# Patient Record
Sex: FEMALE | Race: BLACK OR AFRICAN AMERICAN | NOT HISPANIC OR LATINO | Employment: FULL TIME | URBAN - METROPOLITAN AREA
[De-identification: names, ages, dates, MRNs, and addresses within clinical notes are randomized per-mention and may not be internally consistent; named-entity substitution may affect disease eponyms.]

---

## 2017-07-05 DIAGNOSIS — B02.9 HERPES ZOSTER: ICD-10-CM

## 2017-07-05 RX ORDER — GABAPENTIN 300 MG/1
CAPSULE ORAL
Qty: 90 CAPSULE | Refills: 0 | OUTPATIENT
Start: 2017-07-05

## 2017-07-28 ENCOUNTER — TELEPHONE (OUTPATIENT)
Dept: INTERNAL MEDICINE | Facility: CLINIC | Age: 57
End: 2017-07-28

## 2017-07-31 ENCOUNTER — OFFICE VISIT (OUTPATIENT)
Dept: INTERNAL MEDICINE | Facility: CLINIC | Age: 57
End: 2017-07-31
Payer: COMMERCIAL

## 2017-07-31 VITALS
TEMPERATURE: 97 F | BODY MASS INDEX: 40.73 KG/M2 | SYSTOLIC BLOOD PRESSURE: 110 MMHG | DIASTOLIC BLOOD PRESSURE: 64 MMHG | HEIGHT: 64 IN | HEART RATE: 67 BPM | WEIGHT: 238.56 LBS

## 2017-07-31 DIAGNOSIS — R19.7 DIARRHEA, UNSPECIFIED TYPE: ICD-10-CM

## 2017-07-31 DIAGNOSIS — Z87.19 HISTORY OF DIVERTICULITIS: ICD-10-CM

## 2017-07-31 DIAGNOSIS — R10.31 RLQ ABDOMINAL PAIN: Primary | ICD-10-CM

## 2017-07-31 PROCEDURE — 99999 PR PBB SHADOW E&M-EST. PATIENT-LVL III: CPT | Mod: PBBFAC,,, | Performed by: NURSE PRACTITIONER

## 2017-07-31 PROCEDURE — 99214 OFFICE O/P EST MOD 30 MIN: CPT | Mod: S$GLB,,, | Performed by: NURSE PRACTITIONER

## 2017-07-31 RX ORDER — CIPROFLOXACIN 500 MG/1
500 TABLET ORAL 2 TIMES DAILY
Qty: 14 TABLET | Refills: 0 | Status: SHIPPED | OUTPATIENT
Start: 2017-07-31 | End: 2017-08-07

## 2017-07-31 RX ORDER — METRONIDAZOLE 500 MG/1
500 TABLET ORAL 3 TIMES DAILY
Qty: 21 TABLET | Refills: 0 | Status: SHIPPED | OUTPATIENT
Start: 2017-07-31 | End: 2017-08-07

## 2017-07-31 NOTE — PROGRESS NOTES
"Subjective:       Patient ID: Kellie Jorge is a 57 y.o. female.    Chief Complaint: Diarrhea    Pt presents with complaints or RLQ pain, increased BMs/diarrhea x 5 days    She reports 2 episodes of diarrhea daily with loose/soft stool in between. Stools are a greenish color. She reports that soon as she eats something "the food would go right through her". She has been trying to stay hydrated and taking immodium and pepto bismol. She states that she is convinced that its her "diverticulitis acting up". She has been hospitalized for this condition in the past. Recently was on azithromycin -3 weeks ago. No recent foreigh travel. She denies fever, sweats, chills, n/v, skin rashes, joint pain, confusion, headaches, dizziness, or any other acute symptoms.       Review of Systems   Constitutional: Positive for appetite change. Negative for activity change, chills, diaphoresis, fatigue, fever and unexpected weight change.   HENT: Negative.    Eyes: Negative.    Respiratory: Negative.    Cardiovascular: Negative.    Gastrointestinal: Positive for abdominal pain and diarrhea. Negative for abdominal distention, anal bleeding, blood in stool, constipation, nausea, rectal pain and vomiting.   Endocrine: Negative.    Genitourinary: Negative.    Musculoskeletal: Negative.    Skin: Negative.    Allergic/Immunologic: Negative.    Neurological: Negative.    Hematological: Negative.    Psychiatric/Behavioral: Negative.        Objective:      Physical Exam   Constitutional: She is oriented to person, place, and time. She appears well-developed.   Cardiovascular: Normal rate, regular rhythm and normal heart sounds.    Pulmonary/Chest: Effort normal and breath sounds normal.   Abdominal: She exhibits distension (mild ). There is tenderness in the right lower quadrant.   obese   Musculoskeletal: Normal range of motion.   Neurological: She is alert and oriented to person, place, and time.   Skin: Skin is warm and dry.   Psychiatric: She " has a normal mood and affect.       Assessment:       1. RLQ abdominal pain    2. History of diverticulitis    3. Diarrhea, unspecified type        Plan:   RLQ abdominal pain  -     ciprofloxacin HCl (CIPRO) 500 MG tablet; Take 1 tablet (500 mg total) by mouth 2 (two) times daily.  Dispense: 14 tablet; Refill: 0  -     metronidazole (FLAGYL) 500 MG tablet; Take 1 tablet (500 mg total) by mouth 3 (three) times daily.  Dispense: 21 tablet; Refill: 0    History of diverticulitis  -     ciprofloxacin HCl (CIPRO) 500 MG tablet; Take 1 tablet (500 mg total) by mouth 2 (two) times daily.  Dispense: 14 tablet; Refill: 0  -     metronidazole (FLAGYL) 500 MG tablet; Take 1 tablet (500 mg total) by mouth 3 (three) times daily.  Dispense: 21 tablet; Refill: 0    Diarrhea, unspecified type  -     ciprofloxacin HCl (CIPRO) 500 MG tablet; Take 1 tablet (500 mg total) by mouth 2 (two) times daily.  Dispense: 14 tablet; Refill: 0  -     metronidazole (FLAGYL) 500 MG tablet; Take 1 tablet (500 mg total) by mouth 3 (three) times daily.  Dispense: 21 tablet; Refill: 0      following guidelines will help you care for yourself at home:  · During the acute illness, rest and follow your healthcare provider's instructions about diet. Sometimes you will need to follow a clear liquid diet to rest your bowel. Once your symptoms are better, you may be told to follow a low-fiber diet for some time. Include foods like:  ? Flake cereal, mashed potatoes, pancakes, waffles, pasta, white bread, rice, applesauce, bananas, eggs, fish, poultry, tofu, and cooked soft vegetables  · Take antibiotics exactly as instructed. Don't miss any doses or stop taking the medication, even if you feel better.  · Monitor your temperature and tell your healthcare provider if you have rising temperatures.  Preventing future attacks  Once you have an episode of diverticulitis, you are at risk for having it again. After you have recovered from this episode, you may be able  to lower your risk by eating a high-fiber diet (20 gm/day to 35 gm/day of fiber). This cleans out the colon pouches that already exist and may prevent new ones from forming. Foods high in fiber include fresh fruits and edible peelings, raw or lightly cooked vegetables, whole grain cereals and breads, dried beans and peas, and bran.  Other steps that can help prevent future attacks include:  · Take your medicines, such as antibiotics, as your healthcare provider says.  · Drink 6 to 8 glasses of water every day, unless told otherwise.  · Use a heating pad or hot water bottle to help abdominal cramping or pain.  · Begin an exercise program. Ask your healthcare provider how to get started. You can benefit from simple activities such as walking or gardening.  · Treat diarrhea with a bland diet. Start with liquids only; then slowly add fiber over time.  · Watch for changes in your bowel movements (constipation to diarrhea). Avoid constipation by eating a high fiber diet and taking a stool softener if needed.  · Get plenty of rest and sleep.  Follow-up care  Follow up with your healthcare provider as advised or sooner if you are not getting better in the next 2 days.  When to seek medical advice  Call your healthcare provider right away if any of these occur:  · Fever of 100.4°F (38°C) or higher, or as directed by your healthcare provider  · Repeated vomiting or swelling of the abdomen  · Weakness, dizziness, light-headedness  · Pain in your abdomen that gets worse, severe, or spreads to your back  · Pain that moves to the right lower abdomen  · Rectal bleeding (stools that are red, black or maroon color)  · Unexpected vaginal bleeding

## 2018-01-15 ENCOUNTER — OFFICE VISIT (OUTPATIENT)
Dept: INTERNAL MEDICINE | Facility: CLINIC | Age: 58
End: 2018-01-15
Payer: COMMERCIAL

## 2018-01-15 VITALS
SYSTOLIC BLOOD PRESSURE: 122 MMHG | BODY MASS INDEX: 40.46 KG/M2 | HEART RATE: 62 BPM | TEMPERATURE: 96 F | WEIGHT: 237 LBS | OXYGEN SATURATION: 98 % | HEIGHT: 64 IN | DIASTOLIC BLOOD PRESSURE: 78 MMHG

## 2018-01-15 DIAGNOSIS — E66.01 MORBID OBESITY WITH BMI OF 40.0-44.9, ADULT: ICD-10-CM

## 2018-01-15 DIAGNOSIS — I10 ESSENTIAL HYPERTENSION: ICD-10-CM

## 2018-01-15 DIAGNOSIS — H92.02 ACUTE OTALGIA, LEFT: ICD-10-CM

## 2018-01-15 DIAGNOSIS — J02.0 PHARYNGITIS DUE TO STREPTOCOCCUS SPECIES: Primary | ICD-10-CM

## 2018-01-15 DIAGNOSIS — J01.00 ACUTE NON-RECURRENT MAXILLARY SINUSITIS: ICD-10-CM

## 2018-01-15 LAB — DEPRECATED S PYO AG THROAT QL EIA: NEGATIVE

## 2018-01-15 PROCEDURE — 99214 OFFICE O/P EST MOD 30 MIN: CPT | Mod: S$GLB,,, | Performed by: FAMILY MEDICINE

## 2018-01-15 PROCEDURE — 99999 PR PBB SHADOW E&M-EST. PATIENT-LVL IV: CPT | Mod: PBBFAC,,, | Performed by: FAMILY MEDICINE

## 2018-01-15 PROCEDURE — 87880 STREP A ASSAY W/OPTIC: CPT | Mod: PO

## 2018-01-15 PROCEDURE — 87081 CULTURE SCREEN ONLY: CPT

## 2018-01-15 RX ORDER — FLUTICASONE PROPIONATE 50 MCG
1 SPRAY, SUSPENSION (ML) NASAL DAILY
Qty: 16 G | Refills: 0 | Status: SHIPPED | OUTPATIENT
Start: 2018-01-15

## 2018-01-15 NOTE — MEDICAL/APP STUDENT
"Progress Note    Patient Name: Kellie Jorge  MRN: [id number]    Patient information was obtained from patient.    Subjective       Chief Complaint:   Chief Complaint   Patient presents with    Sore Throat    Otalgia     left ear    Fatigue       HPI   Kellie Jorge is a 57 y.o. female who  has a past medical history of CAD (coronary artery disease); Hyperlipidemia; Hypertension; and Thyroid nodule., presenting with hx of sore throat that has now also caused sinus congestion and a headache.     The history was provided by the patient.    The patient says that the Saturday before Christmas she went to the ED with a severely sore throat. She tested positive for strep and was given clindamycin. In mid January, she still did not feel better so went to a doctor January 12th who gave her rocephin. She states for a few days she felt better and then beginning yesterday began feeling a sore throat again. She says when she swallows it burns all the way down her throat. Today the sore throat is better than yesterday but now has sinus congestion and pain as well as a headache. She also complains of left ear pain. She came today worried that she has strep and that it was never properly treated and is now moving to her ear and sinuses.         Review Of Systems:  Review of Systems   Constitutional: Negative for chills and fever.   HENT: Positive for congestion, ear pain, sinus pain and sore throat.    Respiratory: Negative for cough, sputum production and shortness of breath.    Cardiovascular: Negative for chest pain and palpitations.       Objective     Vitals:    01/15/18 1127   BP: 122/78   Pulse: 62   Temp: 96.3 °F (35.7 °C)   TempSrc: Tympanic   SpO2: 98%   Weight: 107.5 kg (236 lb 15.9 oz)   Height: 5' 4" (1.626 m)       Physical Exam:  Physical Exam   Constitutional: She appears well-developed and well-nourished.   HENT:   Right Ear: Hearing, tympanic membrane and external ear normal.   Left Ear: Hearing, tympanic " membrane and external ear normal. There is tenderness.   Nose: Nose normal.   Mouth/Throat: Uvula is midline. Posterior oropharyngeal erythema present. Tonsils are 1+ on the right. Tonsils are 1+ on the left. No tonsillar exudate.   Eyes: Pupils are equal, round, and reactive to light.   Neck: Normal range of motion.   Cardiovascular: Normal rate and regular rhythm.          ASSESSMENT/PLAN:     Plan    Patient Active Problem List   Diagnosis    Essential hypertension    Gastroesophageal reflux disease       1. Sore throat and sinus congestion  - repeat strep test  - possible stronger abx prescription depending on strep test result  - if strep test is negative, continue flonase for sinus pressure as well as salt water gargles until post infectious sx have cleared.    2. HTN  - continue medications    3. GERD  - continue medications      ______________________________________________________________________  Lyssa Vesselinovitch  Medical Student

## 2018-01-15 NOTE — PROGRESS NOTES
"Subjective:       Patient ID: Kellie Jorge is a 57 y.o. female.    Chief Complaint: Sore Throat; Otalgia (left ear); and Fatigue    57-year-old Afro-American female patient with Patient Active Problem List:     Essential hypertension     Gastroesophageal reflux disease   Here with complaint of being diagnosed with strep throat prior to Christmas, and has been treated with clindamycin, has been checked for flu which was negative.   Patient was also given 2 shots of Rocephin, and has symptoms have been ongoing was given Depo-Medrol last week.   Patient continues to have sore throat, reports that she has minimal pressure in the sinuses, denies of any fever, had to start with.   Patient occasionally feels pain shooting down to her throat, and she was also given Zantac for 10 days.  Patient does have history of sinus infections  Reports that she's been having left ear ache that started few days ago.         Review of Systems   Constitutional: Negative for chills, fatigue and fever.   HENT: Positive for congestion, ear pain, postnasal drip, sinus pain and sore throat. Negative for trouble swallowing and voice change.    Eyes: Negative for visual disturbance.   Respiratory: Negative for cough, shortness of breath and wheezing.    Cardiovascular: Negative for chest pain and leg swelling.   Gastrointestinal: Negative for abdominal pain, nausea and vomiting.   Musculoskeletal: Negative for myalgias.   Skin: Negative for rash.   Neurological: Positive for headaches. Negative for light-headedness.   Psychiatric/Behavioral: Negative for sleep disturbance.         /78   Pulse 62   Temp 96.3 °F (35.7 °C) (Tympanic)   Ht 5' 4" (1.626 m)   Wt 107.5 kg (236 lb 15.9 oz)   SpO2 98%   BMI 40.68 kg/m²   Objective:      Physical Exam   Constitutional: She is oriented to person, place, and time. She appears well-developed and well-nourished.   HENT:   Head: Normocephalic and atraumatic.   Right Ear: External ear normal. "   Left Ear: External ear normal.   Mouth/Throat: Oropharynx is clear and moist. No oropharyngeal exudate.   Positive for sinus pressure in bilateral maxillary areas on palpation   Neck: Neck supple.   Cardiovascular: Normal rate, regular rhythm and normal heart sounds.    No murmur heard.  Pulmonary/Chest: Effort normal and breath sounds normal. She has no wheezes.   Abdominal: Soft. Bowel sounds are normal. There is no tenderness.   Musculoskeletal: She exhibits no edema.   Lymphadenopathy:     She has no cervical adenopathy.   Neurological: She is alert and oriented to person, place, and time.   Skin: Skin is warm and dry. No rash noted.   Psychiatric: She has a normal mood and affect.         Assessment:       1. Pharyngitis due to Streptococcus species    2. Acute otalgia, left    3. Acute non-recurrent maxillary sinusitis    4. Essential hypertension    5. Morbid obesity with BMI of 40.0-44.9, adult        Plan:   Pharyngitis due to Streptococcus species  -     Throat Screen, Rapid  Acute otalgia, left  Acute non-recurrent maxillary sinusitis  -     fluticasone (FLONASE) 50 mcg/actuation nasal spray; 1 spray (50 mcg total) by Each Nare route once daily.  Dispense: 16 g; Refill: 0  Patient reports that she was recently treated treated for strep with clindamycin, Rocephin injection ×2 and Depo-Medrol shot recently  Flonase prescribed today for sinus infection  Could be lingering infection secondary to bad weather  Will recheck for strep for clearance  Salt water gargles and Chloraseptic sprays recommended  Advised to take over-the-counter Zyrtec and drink adequate fluids  Can take ibuprofen as needed for pain    Essential hypertension-blood pressure stable today currently on Edarbi and clonidine 0.2 mg daily     Morbid obesity with BMI of 40.0-44.9, adult-lifestyle modifications recommended to lose weight with BMI 40

## 2018-01-17 LAB — BACTERIA THROAT CULT: NORMAL

## 2018-01-24 ENCOUNTER — TELEPHONE (OUTPATIENT)
Dept: INTERNAL MEDICINE | Facility: CLINIC | Age: 58
End: 2018-01-24

## 2018-01-24 DIAGNOSIS — J02.9 PHARYNGITIS, UNSPECIFIED ETIOLOGY: Primary | ICD-10-CM

## 2018-01-24 NOTE — TELEPHONE ENCOUNTER
----- Message from Lucero Leonard LPN sent at 1/24/2018  2:34 PM CST -----  Contact: Patient      ----- Message -----  From: Sera Caro  Sent: 1/24/2018   2:26 PM  To: [facility] Staff    Patient called and stated she is having a sore throat and she wants to know if she can be referral to ENT. She can be contacted at [phone].    Thanks,  Sera

## 2018-01-25 ENCOUNTER — OFFICE VISIT (OUTPATIENT)
Dept: OTOLARYNGOLOGY | Facility: CLINIC | Age: 58
End: 2018-01-25
Payer: COMMERCIAL

## 2018-01-25 VITALS — BODY MASS INDEX: 40.57 KG/M2 | TEMPERATURE: 98 F | WEIGHT: 236.31 LBS

## 2018-01-25 DIAGNOSIS — Z86.39 HISTORY OF THYROID NODULE: ICD-10-CM

## 2018-01-25 DIAGNOSIS — H92.02 LEFT EAR PAIN: ICD-10-CM

## 2018-01-25 DIAGNOSIS — J02.9 SORE THROAT: Primary | ICD-10-CM

## 2018-01-25 PROCEDURE — 99999 PR PBB SHADOW E&M-EST. PATIENT-LVL III: CPT | Mod: PBBFAC,,, | Performed by: PHYSICIAN ASSISTANT

## 2018-01-25 PROCEDURE — 99244 OFF/OP CNSLTJ NEW/EST MOD 40: CPT | Mod: S$GLB,,, | Performed by: PHYSICIAN ASSISTANT

## 2018-01-25 NOTE — LETTER
January 25, 2018      Clara Villanueva MD  [address] [place] [address]           [facility]  [address] [facility] [place] [address]  Phone: [phone]  Fax: [phone]          Patient: Kellie Jorge   MR Number: [id number]   YOB: 1960   Date of Visit: 1/25/2018       Dear Dr. Clara Villanueva:    Thank you for referring Kellie Jorge to me for evaluation. Attached you will find relevant portions of my assessment and plan of care.    If you have questions, please do not hesitate to call me. I look forward to following Kellie Jorge along with you.    Sincerely,    Agnes Adorno PA-C    Enclosure  CC:  No Recipients    If you would like to receive this communication electronically, please contact [email or web address] or [phone] to request more information on "[employer] Link access.    For providers and/or their staff who would like to refer a patient to [facility], please contact us through our one-stop-shop provider referral line, [facility] , at [phone].    If you feel you have received this communication in error or would no longer like to receive these types of communications, please e-mail [email or web address]

## 2018-01-29 ENCOUNTER — HOSPITAL ENCOUNTER (OUTPATIENT)
Dept: RADIOLOGY | Facility: HOSPITAL | Age: 58
Discharge: HOME | End: 2018-01-29
Attending: PHYSICIAN ASSISTANT
Payer: COMMERCIAL

## 2018-01-29 DIAGNOSIS — Z86.39 HISTORY OF THYROID NODULE: ICD-10-CM

## 2018-01-29 PROCEDURE — 76536 US EXAM OF HEAD AND NECK: CPT | Mod: 26,,, | Performed by: RADIOLOGY

## 2018-01-29 PROCEDURE — 76536 US EXAM OF HEAD AND NECK: CPT | Mod: TC,PO

## 2018-01-30 ENCOUNTER — OFFICE VISIT (OUTPATIENT)
Dept: OTOLARYNGOLOGY | Facility: CLINIC | Age: 58
End: 2018-01-30
Payer: COMMERCIAL

## 2018-01-30 VITALS
TEMPERATURE: 98 F | SYSTOLIC BLOOD PRESSURE: 144 MMHG | BODY MASS INDEX: 40.04 KG/M2 | WEIGHT: 233.25 LBS | HEART RATE: 64 BPM | DIASTOLIC BLOOD PRESSURE: 83 MMHG

## 2018-01-30 DIAGNOSIS — J31.2 CHRONIC SORE THROAT: Primary | ICD-10-CM

## 2018-01-30 DIAGNOSIS — E04.1 THYROID NODULE: ICD-10-CM

## 2018-01-30 DIAGNOSIS — K21.9 GASTROESOPHAGEAL REFLUX DISEASE, ESOPHAGITIS PRESENCE NOT SPECIFIED: ICD-10-CM

## 2018-01-30 PROCEDURE — 99214 OFFICE O/P EST MOD 30 MIN: CPT | Mod: 25,S$GLB,, | Performed by: ORTHOPAEDIC SURGERY

## 2018-01-30 PROCEDURE — 99999 PR PBB SHADOW E&M-EST. PATIENT-LVL III: CPT | Mod: PBBFAC,,, | Performed by: ORTHOPAEDIC SURGERY

## 2018-01-30 PROCEDURE — 31575 DIAGNOSTIC LARYNGOSCOPY: CPT | Mod: S$GLB,,, | Performed by: ORTHOPAEDIC SURGERY

## 2018-01-30 NOTE — PROGRESS NOTES
Subjective:      Patient ID: Kellie Jorge is a 57 y.o. female.    Chief Complaint: Results (Review thyroid ultrasound)    Patient is a very pleasant 57 year old female here to see me today in followup for evaluation of intermittent throat pain.  She says that it has been an issue since she had strep throat in December.  I saw her in 2016 with similar complaints, and she had seen an ENT just prior to that visit who had done a FFL which was normal.  She has not noted any reflux or heartburn, but does have with certain foods.  She is taking OTC omeprazole twice daily as needed for reflux, and has been using consistently due to recent throat pain.  She has recently had a thyroid US, she says that she had an US several years ago with a very small abnormality.  She has no dysphagia to solids or liquids, but does have intermittent odynophagia.  She has not seen GI.  She has no family history of thyroid issues.          Review of Systems   Constitutional: Negative for chills, fatigue, fever and unexpected weight change.   HENT: Positive for congestion, postnasal drip, sore throat and trouble swallowing. Negative for dental problem, ear discharge, ear pain, facial swelling, hearing loss, nosebleeds, rhinorrhea, sinus pressure, sneezing, tinnitus and voice change.    Eyes: Negative for redness, itching and visual disturbance.   Respiratory: Negative for cough, choking, shortness of breath and wheezing.    Cardiovascular: Negative for chest pain and palpitations.   Gastrointestinal: Negative for abdominal pain.        No reflux.   Musculoskeletal: Negative for gait problem.   Skin: Negative for rash.   Neurological: Positive for headaches. Negative for dizziness and light-headedness.       Objective:       Physical Exam   Constitutional: She is oriented to person, place, and time. She appears well-developed and well-nourished. No distress.   HENT:   Head: Normocephalic and atraumatic.   Right Ear: Tympanic membrane, external  ear and ear canal normal.   Left Ear: Tympanic membrane, external ear and ear canal normal.   Nose: Nose normal. No mucosal edema, rhinorrhea, nasal deformity or septal deviation. No epistaxis. Right sinus exhibits no maxillary sinus tenderness and no frontal sinus tenderness. Left sinus exhibits no maxillary sinus tenderness and no frontal sinus tenderness.   Mouth/Throat: Uvula is midline, oropharynx is clear and moist and mucous membranes are normal. Mucous membranes are not pale and not dry. No dental caries. No oropharyngeal exudate or posterior oropharyngeal erythema.   Eyes: Conjunctivae, EOM and lids are normal. Pupils are equal, round, and reactive to light. Right eye exhibits no chemosis. Left eye exhibits no chemosis. Right conjunctiva is not injected. Left conjunctiva is not injected. No scleral icterus. Right eye exhibits normal extraocular motion and no nystagmus. Left eye exhibits normal extraocular motion and no nystagmus.   Neck: Trachea normal and phonation normal. No tracheal tenderness present. No tracheal deviation present. No thyroid mass and no thyromegaly present.   Cardiovascular: Intact distal pulses.    Pulmonary/Chest: Effort normal. No stridor. No respiratory distress.   Abdominal: She exhibits no distension.   Lymphadenopathy:        Head (right side): No submental, no submandibular, no preauricular, no posterior auricular and no occipital adenopathy present.        Head (left side): No submental, no submandibular, no preauricular, no posterior auricular and no occipital adenopathy present.     She has no cervical adenopathy.   Neurological: She is alert and oriented to person, place, and time. No cranial nerve deficit.   Skin: Skin is warm and dry. No rash noted. No erythema.   Psychiatric: She has a normal mood and affect. Her behavior is normal.     US THYROID:    Findings: The thyroid gland demonstrates a smooth homogeneous appearance. The right lobe measures 5.1 x 1.7 x 1.6 cm.  Left lobe measures 4.8 x 1.6 x 1.2 cm. This isthmus measures 2.7 mm in thickness. Within the inferior right lobe there is a 3 mm complex cystic nodule. No other thyroid nodule is identified.    Overall gland volume measures 11.3 mm.   Impression      Homogeneous appearance of the thyroid gland. Please see above for gland measurements. No suspicious nodules identified at this time.       Due to indication in patient's history, presentation or risk factors,  a fiber optic exam was performed.    SEPARATE PROCEDURE NOTE:    ANESTHESIA:  Topical xylocaine with roxanna-synephrine  FINDINGS:  Normal exam      PROCEDURE:  After verbal consent was obtained, the flexible scope was passed through the patient's nasal cavity without difficulty.  The nasopharynx (adenoid pad) and eustachian tube orifices were first visualized and were found to be normal, without masses or irregularity.  The posterior pharyngeal wall and base of tongue were then examined and no mass or irregular tissue was seen.  The scope was then advanced to the larynx, and the epiglottis, valleculae, and piriform sinuses were normal, without masses or mucosal irregularity.  The false vocal folds and true vocal folds were then examined and were found to have normal mobility (full abduction and adduction) and no masses or mucosal irregularity was seen.  The arytenoids and the interarytenoid area were normal.          Assessment:       1. Chronic sore throat    2. Gastroesophageal reflux disease, esophagitis presence not specified    3. Thyroid nodule        Plan:     Chronic sore throat:  Has been an intermittent issue for over one year.  Reassured patient that her exam today is normal, and does not show any cause for her throat pain.  At this point, I would recommend evaluation with GI, referral placed and scheduled.    Gastroesophageal reflux disease, esophagitis presence not specified:  Currently on prilosec bid.  Will have GI evaluation as above.    Thyroid  nodule:  Very small, would recommend repeat US in 1-2 years with PCP, return to clinic for any change or growth in the nodule.

## 2018-02-06 ENCOUNTER — INITIAL CONSULT (OUTPATIENT)
Dept: GASTROENTEROLOGY | Facility: CLINIC | Age: 58
End: 2018-02-06
Payer: COMMERCIAL

## 2018-02-06 VITALS
HEIGHT: 65 IN | HEART RATE: 66 BPM | SYSTOLIC BLOOD PRESSURE: 152 MMHG | WEIGHT: 237.63 LBS | BODY MASS INDEX: 39.59 KG/M2 | DIASTOLIC BLOOD PRESSURE: 78 MMHG

## 2018-02-06 DIAGNOSIS — J02.9 SORE THROAT: ICD-10-CM

## 2018-02-06 DIAGNOSIS — K21.9 GASTROESOPHAGEAL REFLUX DISEASE, ESOPHAGITIS PRESENCE NOT SPECIFIED: Primary | ICD-10-CM

## 2018-02-06 PROCEDURE — 99999 PR PBB SHADOW E&M-EST. PATIENT-LVL III: CPT | Mod: PBBFAC,,, | Performed by: NURSE PRACTITIONER

## 2018-02-06 PROCEDURE — 3008F BODY MASS INDEX DOCD: CPT | Mod: ,,, | Performed by: NURSE PRACTITIONER

## 2018-02-06 PROCEDURE — 99204 OFFICE O/P NEW MOD 45 MIN: CPT | Mod: S$GLB,,, | Performed by: NURSE PRACTITIONER

## 2018-02-06 RX ORDER — OMEPRAZOLE 40 MG/1
40 CAPSULE, DELAYED RELEASE ORAL DAILY
Qty: 30 CAPSULE | Refills: 5 | Status: SHIPPED | OUTPATIENT
Start: 2018-02-06 | End: 2018-07-19

## 2018-02-06 NOTE — LETTER
February 6, 2018      Samantha Shanks MD  [address] [place] [address] [address] [place] [address]           [facility]  [address] [facility] [address] [place] [address]  Phone: [phone]  Fax: [phone]          Patient: Kellie Jorge   MR Number: [id number]   YOB: 1960   Date of Visit: 2/6/2018       Dear Dr. Samantha Shanks:    Thank you for referring Kellie Jorge to me for evaluation. Attached you will find relevant portions of my assessment and plan of care.    If you have questions, please do not hesitate to call me. I look forward to following Kellie Jorge along with you.    Sincerely,    Magaly Reid, NP    Enclosure  CC:  No Recipients    If you would like to receive this communication electronically, please contact [email or web address] or [phone] to request more information on [employer] Link access.    For providers and/or their staff who would like to refer a patient to [facility], please contact us through our one-stop-shop provider referral line, [facility], at [phone].    If you feel you have received this communication in error or would no longer like to receive these types of communications, please e-mail [email or web address]

## 2018-02-06 NOTE — PROGRESS NOTES
Clinic Consult:  [facility] Consultation Note    Reason for Consult:  The primary encounter diagnosis was Gastroesophageal reflux disease, esophagitis presence not specified. A diagnosis of Sore throat was also pertinent to this visit.    PCP: Clara Villanueva   [address] [place] [address]  / [place] [id number]    HPI:  This is a 57 y.o. female here for evaluation of the above issues. She was referred to me by Dr. Shanks. She presents to clinic with complaints of chronic sore throat. Onset of symptoms began in April 2016 and have been intermittent since then. She has a history of GERD and has been on omeprazole 20mg twice a day without relief. She also reports some discomfort when eating but denies any dysphagia. ENT evaluation unrevealing. Recommended GI referral for evaluation of reflux as potential etiology of sore throat. No nausea or vomiting. She has been taking NSAIDs for throat pain.     Review of Systems   Constitutional: Negative for fever, malaise/fatigue and weight loss.   HENT: Positive for sore throat.    Respiratory: Negative for cough and wheezing.    Cardiovascular: Negative for chest pain and palpitations.   Gastrointestinal: Negative for abdominal pain, blood in stool, constipation, diarrhea, melena, nausea and vomiting.   Genitourinary: Negative for dysuria and frequency.   Musculoskeletal: Negative for back pain, joint pain, myalgias and neck pain.   Skin: Negative for itching and rash.   Neurological: Negative for dizziness, speech change, seizures, loss of consciousness and headaches.   Psychiatric/Behavioral: Negative for depression and substance abuse. The patient is not nervous/anxious.        Medical History:  has a past medical history of CAD (coronary artery disease); Hyperlipidemia; Hypertension; and Thyroid nodule.    Surgical History:  has a past surgical history that includes Cholecystectomy; Appendectomy; and Coronary artery bypass graft.    Family History: family history  "includes Heart disease in her father; Hypertension in her father; No Known Problems in her mother..     Social History:  reports that she has never smoked. She has never used smokeless tobacco. She reports that she does not drink alcohol.    Allergies: Reviewed    Home Medications:   Current Outpatient Prescriptions on File Prior to Visit   Medication Sig Dispense Refill    aspirin 325 MG tablet Take 325 mg by mouth once daily.      azilsartan medoxomil (EDARBI) 80 mg Tab Take 1 tablet by mouth once daily.      cloNIDine (CATAPRES) 0.2 MG tablet Take 0.2 mg by mouth every evening.      fluticasone (FLONASE) 50 mcg/actuation nasal spray 1 spray (50 mcg total) by Each Nare route once daily. 16 g 0    furosemide (LASIX) 40 MG tablet Take 40 mg by mouth once daily.      POTASSIUM CHLORIDE (KLOR-CON ORAL) Take 1 tablet by mouth once daily.       No current facility-administered medications on file prior to visit.        Physical Exam:  Vital Signs:  BP (!) 152/78   Pulse 66   Ht 5' 4.8" (1.646 m)   Wt 107.8 kg (237 lb 10.5 oz)   BMI 39.79 kg/m²   Body mass index is 39.79 kg/m².  Physical Exam   Constitutional: She is oriented to person, place, and time and well-developed, well-nourished, and in no distress. No distress.   HENT:   Head: Normocephalic.   Eyes: Conjunctivae are normal. Pupils are equal, round, and reactive to light.   Cardiovascular: Normal rate, regular rhythm and normal heart sounds.    Pulmonary/Chest: Effort normal and breath sounds normal. No respiratory distress.   Abdominal: Soft. Bowel sounds are normal. She exhibits no distension. There is no tenderness.   Neurological: She is alert and oriented to person, place, and time. No cranial nerve deficit.   Skin: Skin is warm and dry. No rash noted.   Psychiatric: Mood and affect normal.       Labs: Pertinent labs reviewed.    Endoscopy:  None prior    CRC Screening: per patient, up to date. Done at external facility    Assessment:  1. " Gastroesophageal reflux disease, esophagitis presence not specified    2. Sore throat           Recommendations:  Gastroesophageal reflux disease, esophagitis presence not specified  - continue omeprazole but refilled medication with increased dose.   - add zantac  - unclear if etiology of store throat  - EGD needed for further evaluation  -     omeprazole (PRILOSEC) 40 MG capsule; Take 1 capsule (40 mg total) by mouth once daily.  Dispense: 30 capsule; Refill: 5  -     ranitidine (ZANTAC) 150 MG tablet; Take 1 tablet (150 mg total) by mouth 2 (two) times daily.  Dispense: 60 tablet; Refill: 5  -     Case request GI: ESOPHAGOGASTRODUODENOSCOPY (EGD)    Sore throat  - unclear etiology  - plan for EGD  - follow up in 6 weeks.   -     Case request GI: ESOPHAGOGASTRODUODENOSCOPY (EGD)    Follow-up in about 6 weeks (around 3/20/2018).    Thank you so much for allowing me to participate in the care of LINA Miramontes

## 2018-02-12 ENCOUNTER — ANESTHESIA EVENT (OUTPATIENT)
Dept: ENDOSCOPY | Facility: HOSPITAL | Age: 58
End: 2018-02-12
Payer: COMMERCIAL

## 2018-02-12 ENCOUNTER — ANESTHESIA (OUTPATIENT)
Dept: ENDOSCOPY | Facility: HOSPITAL | Age: 58
End: 2018-02-12
Payer: COMMERCIAL

## 2018-02-12 ENCOUNTER — SURGERY (OUTPATIENT)
Age: 58
End: 2018-02-12

## 2018-02-12 ENCOUNTER — HOSPITAL ENCOUNTER (OUTPATIENT)
Facility: HOSPITAL | Age: 58
Discharge: HOME | End: 2018-02-12
Attending: INTERNAL MEDICINE | Admitting: INTERNAL MEDICINE
Payer: COMMERCIAL

## 2018-02-12 VITALS
BODY MASS INDEX: 39.95 KG/M2 | HEART RATE: 55 BPM | RESPIRATION RATE: 16 BRPM | WEIGHT: 234 LBS | SYSTOLIC BLOOD PRESSURE: 123 MMHG | DIASTOLIC BLOOD PRESSURE: 69 MMHG | TEMPERATURE: 98 F | OXYGEN SATURATION: 97 % | HEIGHT: 64 IN

## 2018-02-12 DIAGNOSIS — K21.9 GERD (GASTROESOPHAGEAL REFLUX DISEASE): ICD-10-CM

## 2018-02-12 DIAGNOSIS — K22.10 ULCER OF ESOPHAGUS WITHOUT BLEEDING: ICD-10-CM

## 2018-02-12 DIAGNOSIS — K25.7 CHRONIC GASTRIC ULCER WITHOUT HEMORRHAGE AND WITHOUT PERFORATION: Primary | ICD-10-CM

## 2018-02-12 DIAGNOSIS — K29.30 SUPERFICIAL GASTRITIS WITHOUT HEMORRHAGE, UNSPECIFIED CHRONICITY: ICD-10-CM

## 2018-02-12 PROCEDURE — 25000003 PHARM REV CODE 250: Performed by: NURSE ANESTHETIST, CERTIFIED REGISTERED

## 2018-02-12 PROCEDURE — 43239 EGD BIOPSY SINGLE/MULTIPLE: CPT | Performed by: INTERNAL MEDICINE

## 2018-02-12 PROCEDURE — 37000008 HC ANESTHESIA 1ST 15 MINUTES: Performed by: INTERNAL MEDICINE

## 2018-02-12 PROCEDURE — 37000009 HC ANESTHESIA EA ADD 15 MINS: Performed by: INTERNAL MEDICINE

## 2018-02-12 PROCEDURE — 25000003 PHARM REV CODE 250: Performed by: INTERNAL MEDICINE

## 2018-02-12 PROCEDURE — 88305 TISSUE EXAM BY PATHOLOGIST: CPT | Performed by: PATHOLOGY

## 2018-02-12 PROCEDURE — 63600175 PHARM REV CODE 636 W HCPCS: Performed by: NURSE ANESTHETIST, CERTIFIED REGISTERED

## 2018-02-12 PROCEDURE — 27201012 HC FORCEPS, HOT/COLD, DISP: Performed by: INTERNAL MEDICINE

## 2018-02-12 PROCEDURE — 88305 TISSUE EXAM BY PATHOLOGIST: CPT | Mod: 26,,, | Performed by: PATHOLOGY

## 2018-02-12 PROCEDURE — 43239 EGD BIOPSY SINGLE/MULTIPLE: CPT | Mod: ,,, | Performed by: INTERNAL MEDICINE

## 2018-02-12 RX ORDER — PROPOFOL 10 MG/ML
VIAL (ML) INTRAVENOUS
Status: DISCONTINUED | OUTPATIENT
Start: 2018-02-12 | End: 2018-02-12

## 2018-02-12 RX ORDER — LIDOCAINE HYDROCHLORIDE 10 MG/ML
INJECTION INFILTRATION; PERINEURAL
Status: DISCONTINUED | OUTPATIENT
Start: 2018-02-12 | End: 2018-02-12

## 2018-02-12 RX ORDER — SODIUM CHLORIDE, SODIUM LACTATE, POTASSIUM CHLORIDE, CALCIUM CHLORIDE 600; 310; 30; 20 MG/100ML; MG/100ML; MG/100ML; MG/100ML
INJECTION, SOLUTION INTRAVENOUS CONTINUOUS
Status: DISCONTINUED | OUTPATIENT
Start: 2018-02-12 | End: 2018-02-12 | Stop reason: HOSPADM

## 2018-02-12 RX ADMIN — PROPOFOL 50 MG: 10 INJECTION, EMULSION INTRAVENOUS at 03:02

## 2018-02-12 RX ADMIN — SODIUM CHLORIDE, SODIUM LACTATE, POTASSIUM CHLORIDE, AND CALCIUM CHLORIDE: .6; .31; .03; .02 INJECTION, SOLUTION INTRAVENOUS at 02:02

## 2018-02-12 RX ADMIN — PROPOFOL 50 MG: 10 INJECTION, EMULSION INTRAVENOUS at 04:02

## 2018-02-12 RX ADMIN — LIDOCAINE HYDROCHLORIDE 50 MG: 10 INJECTION, SOLUTION INFILTRATION; PERINEURAL at 03:02

## 2018-02-12 NOTE — DISCHARGE INSTRUCTIONS
esoph  Esophagitis     With esophagitis, the lining of the esophagus is inflamed.   Do you often have burning pain in your chest? You may have esophagitis. This is when the lining of the esophagus becomes red and swollen (inflamed). The esophagus is the tube that connects your throat to your stomach. This sheet tells you more about esophagitis. It also explains your treatment options.  Main types of esophagitis  Reflux esophagitis. This is the more common type. It is caused by GERD (gastroesophageal reflux disease). Stomach contents with stomach acid flow back up into the esophagus. This happens over and over. It leads to inflammation. Risk factors can include:  · Being overweight  · Asthma  · Smoking  · Pregnancy  · Frequent vomiting  · Certain medicines (such as aspirin and other anti-inflammatories)  · Hiatal hernia  Infectious esophagitis. This is caused by an infection. You are more at risk for this if you have a weakened immune system and poor nutrition. Antibiotic use can also be a factor. The infection is often due to the following:  · A type of fungus (typically candida)  · A virus, such as herpes simplex virus 1 (HSV-1) or cytomegalovirus (CMV)  Eosinophilic esophagitis. Foods or other things around you can give you an allergic reaction. This triggers an immune response and leads to esophagitis.  Pill-induced esophagitis. Certain types of medicines can cause inflammation and ulcers in the esophagus. These include doxycycline, aspirin, NSAIDs, alendronate, potassium, quinidine, iron.  Symptoms of esophagitis  The following symptoms can occur with esophagitis:  · Pain when swallowing, or trouble swallowing  · Pain behind your breastbone (heartburn)  · Acid regurgitation  · Chronic sore throat  · Gum Inflammation  · Cavities  · Bad breath  · Nausea  · Pain in your upper belly (abdomen)  · Bleeding (indicated by bright red vomit or black, tarry stool)  These symptoms occur more often with reflux  esophagitis:  · Coughing, wheezing, or asthma  · Hoarseness  Diagnosis of esophagitis  Your healthcare provider will ask about your health history and symptoms. Youll also be examined. Sometimes certain tests are needed. These may include:  · Upper endoscopy. A thin, flexible tube with a tiny light and camera is used. It is inserted through the mouth down into the esophagus. This lets the provider look for damage. A small sample of tissue (biopsy) may also be removed. The sample is sent to a lab for testing.  · Upper GI X-ray with barium. An X-ray is done after you drink a substance called barium. Barium may make problems in the esophagus easier to see on an x-ray.  · Esophageal pH. A soft, thin tube is passed into the esophagus through the nose or mouth for 24 hours. It measures the acid level in the esophagus.  · Esophageal manometry. A soft, thin tube is passed into the esophagus through the nose or mouth. It measures muscle contractions in the esophagus.  Treatment of esophagitis  Medicines. Different medicines can help treat esophagitis. The medicine used will depend on the type of esophagitis you have. Talk with your healthcare provider.  Lifestyle changes. Making the following changes can help reduce irritation and ease your symptoms:  · Avoid spicy foods (pepper, chili powder, jones). Also avoid hard foods (nuts, crackers, raw vegetables) and acidic foods and drinks (tomatoes, citrus fruits and juices). Other problem foods include chocolate, peppermint, nutmeg, and foods high in fat.  · Until you can swallow without pain, follow a combined liquid and soft diet. Try foods such as cooked cereals, mashed potatoes, and soups.  · Take small bites and chew your food thoroughly.  · Avoid large meals and heavy evening meals. Don't lie down within 2 to 3 hours of eating.  · Get to or stay at a healthy weight.  · Avoid alcohol, caffeine, and smoking or tobacco products.  · Brush and floss your teeth  · Raise your  upper body by 4 to 6 inches when lying in bed. This can be done using a foam wedge. Or put blocks under the legs at the head of your bed.  Surgery. This may be needed for severe reflux esophagitis. Other noninvasive procedures to treat GERD and esophagitis are being studied. Your provider can tell you more.  Why treatment Is important  Without treatment, esophagitis can get worse. This is especially true with severe reflux esophagitis. For instance, continued symptoms can cause scarring of the esophagus. Over time, this can cause a narrowing the esophagus (stricture). This can make it hard to pass food down to the stomach. As symptoms go on they can also cause changes in the lining of the esophagus. These changes can put you at a slightly higher risk of cancer of the esophagus.   Date Last Reviewed: 7/1/2016 © 2000-2017 [employer]. [address] [place] [address]. All rights reserved. This information is not intended as a substitute for professional medical care. Always follow your healthcare professional's instructions.        Gastritis (Adult)    Gastritis is inflammation and irritation of the stomach lining. It can be present for a short time (acute) or be long lasting (chronic). Gastritis is often caused by infection with bacteria called H pylori. More than a third of people in the [place] have this bacteria in their bodies. In many cases, H pylori causes no problems or symptoms. In some people, though, the infection irritates the stomach lining and causes gastritis. Other causes of stomach irritation include drinking alcohol or taking pain-relieving medicines called NSAIDs (such as aspirin or ibuprofen).   Symptoms of gastritis can include:  · Abdominal pain or bloating  · Loss of appetite  · Nausea or vomiting  · Vomiting blood or having black stools  · Feeling more tired than usual  An inflamed and irritated stomach lining is more likely to develop a sore called an ulcer. To help prevent  this, gastritis should be treated.  Home care  If needed, medicines may be prescribed. If you have H pylori infection, treating it will likely relieve your symptoms. Other changes can help reduce stomach irritation and help it heal.  · If you have been prescribed medicines for H pylori infection, take them as directed. Take all of the medicine until it is finished or your healthcare provider tells you to stop, even if you feel better.  · Your healthcare provider may recommend avoiding NSAIDs. If you take daily aspirin for your heart or other medical reasons, do not stop without talking to your healthcare provider first.  · Avoid drinking alcohol.  · Stop smoking. Smoking can irritate the stomach and delay healing. As much as possible, stay away from second hand smoke.  Follow-up care  Follow up with your healthcare provider, or as advised by our staff. Testing may be needed to check for inflammation or an ulcer.  When to seek medical advice  Call your healthcare provider for any of the following:  · Stomach pain that gets worse or moves to the lower right abdomen (appendix area)  · Chest pain that appears or gets worse, or spreads to the back, neck, shoulder, or arm  · Frequent vomiting (cant keep down liquids)  · Blood in the stool or vomit (red or black in color)  · Feeling weak or dizzy  · Fever of 100.4ºF (38ºC) or higher, or as directed by your healthcare provider  Date Last Reviewed: 6/22/2015  © 0364-8386 [employer]. [address] [place] [address], [place] [address]. All rights reserved. This information is not intended as a substitute for professional medical care. Always follow your healthcare professional's instructions.

## 2018-02-12 NOTE — DISCHARGE SUMMARY
[facility]  Brief Operative Note     SUMMARY     Surgery Date: 2/12/2018     Surgeon(s) and Role:     * Keegan Arvizu III, MD - Primary    Assisting Surgeon: None    Pre-op Diagnosis:  Sore throat [J02.9]  Gastroesophageal reflux disease, esophagitis presence not specified [K21.9]    Post-op Diagnosis:  Post-Op Diagnosis Codes:     * Sore throat [J02.9]     * Gastroesophageal reflux disease, esophagitis presence not specified [K21.9]      Gastritis      Gastric Ulcer      Esophageal Ulcer  Procedure(s) (LRB):  ESOPHAGOGASTRODUODENOSCOPY (EGD) (N/A)    Anesthesia: Monitor Anesthesia Care    Description of the findings of the procedure: Procedures completed. See Procedure note for full details.    Findings/Key Components: Procedures completed. See Procedure note for full details.    Prosthetics/Devices: None    Estimated Blood Loss: * No values recorded between 2/12/2018 12:00 AM and 2/12/2018  4:29 PM *         Specimens:   Specimen (12h ago through future)    Start     Ordered    02/12/18 1607  Specimen to Pathology - Surgery  Once     Comments:  #1 Antral BX's Superficial ulcer R/O H pylori#2 Gastric BX's, diffuse gastritis#3 Esophageal bx's @ 30 cm Superficial ulcer      02/12/18 1611          Discharge Note    SUMMARY     Admit Date: 2/12/2018    Discharge Date and Time: 2/12/2018    Hospital Course (synopsis of major diagnoses, care, treatment, and services provided during the course of the hospital stay):  Procedures completed. See Procedure note for full details. Discharge patient when discharge criteria met.    Final Diagnosis: Post-Op Diagnosis Codes:     * Sore throat [J02.9]     * Gastroesophageal reflux disease, esophagitis presence not specified [K21.9]      - Gastritis      - Gastric ulcer      - Esophageal ulcer  Disposition: Discharge patient when discharge criteria met.    Follow Up/Patient Instructions:       Medications:  Reconciled Home Medications: Current Discharge Medication  List      CONTINUE these medications which have NOT CHANGED    Details   aspirin 325 MG tablet Take 325 mg by mouth once daily.      azilsartan medoxomil (EDARBI) 80 mg Tab Take 1 tablet by mouth once daily.      cloNIDine (CATAPRES) 0.2 MG tablet Take 0.2 mg by mouth every evening.      fluticasone (FLONASE) 50 mcg/actuation nasal spray 1 spray (50 mcg total) by Each Nare route once daily.  Qty: 16 g, Refills: 0    Associated Diagnoses: Acute non-recurrent maxillary sinusitis      furosemide (LASIX) 40 MG tablet Take 40 mg by mouth once daily.      omeprazole (PRILOSEC) 40 MG capsule Take 1 capsule (40 mg total) by mouth once daily.  Qty: 30 capsule, Refills: 5    Associated Diagnoses: Gastroesophageal reflux disease, esophagitis presence not specified      POTASSIUM CHLORIDE (KLOR-CON ORAL) Take 1 tablet by mouth once daily.      ranitidine (ZANTAC) 150 MG tablet Take 1 tablet (150 mg total) by mouth 2 (two) times daily.  Qty: 60 tablet, Refills: 5    Associated Diagnoses: Gastroesophageal reflux disease, esophagitis presence not specified              Discharge Procedure Orders  Diet general     Activity as tolerated

## 2018-02-12 NOTE — ANESTHESIA POSTPROCEDURE EVALUATION
"Anesthesia Post Evaluation    Patient: Kellie Mariscal    Procedure(s) Performed: Procedure(s) (LRB):  ESOPHAGOGASTRODUODENOSCOPY (EGD) (N/A)    Final Anesthesia Type: MAC  Patient location during evaluation: GI PACU  Patient participation: Yes- Able to Participate  Level of consciousness: awake and alert and oriented  Post-procedure vital signs: reviewed and stable  Pain management: adequate  Airway patency: patent  PONV status at discharge: No PONV  Anesthetic complications: no      Cardiovascular status: blood pressure returned to baseline  Respiratory status: unassisted, room air and spontaneous ventilation  Hydration status: euvolemic  Follow-up not needed.        Visit Vitals  /73 (BP Location: Left arm, Patient Position: Lying)   Pulse (!) 54   Temp 36.7 °C (98 °F) (Oral)   Resp 18   Ht 5' 4" (1.626 m)   Wt 106.1 kg (234 lb)   SpO2 95%   Breastfeeding? No   BMI 40.17 kg/m²       Pain/Dane Score: Presence of Pain: denies (2/12/2018  1:55 PM)  Dane Score: 9 (2/12/2018  4:19 PM)      "

## 2018-02-12 NOTE — ANESTHESIA PREPROCEDURE EVALUATION
02/12/2018  Kellie Mariscal is a 57 y.o., female.    Anesthesia Evaluation    I have reviewed the Patient Summary Reports.    I have reviewed the Nursing Notes.   I have reviewed the Medications.     Review of Systems  Anesthesia Hx:  No problems with previous Anesthesia  History of prior surgery of interest to airway management or planning: heart surgery. Previous anesthesia: General Denies Family Hx of Anesthesia complications.   Denies Personal Hx of Anesthesia complications.   Social:  Non-Smoker    Hematology/Oncology:  Hematology Normal   Oncology Normal     EENT/Dental:EENT/Dental Normal   Cardiovascular:   Hypertension CAD  CABG/stent  Denies any chest pain or sob. Sees cardiology every 4 months saw last week Coronary Artery Disease: S/P Aorto-Coronary Bypass Graft Surgery (CABG):  Hypertension, Essential Hypertension    Renal/:  Renal/ Normal     Hepatic/GI:   GERD  Esophageal / Stomach Disorders Gerd    Musculoskeletal:  Musculoskeletal Normal    Neurological:  Neurology Normal    Endocrine:   Thyroid nodule just watching Metabolic Disorders, Obesity / BMI > 30  Dermatological:  Skin Normal    Psych:  Psychiatric Normal           Physical Exam  General:  Obesity    Airway/Jaw/Neck:  Airway Findings: Mouth Opening: Normal Tongue: Normal  General Airway Assessment: Adult  Mallampati: III  Improves to II with phonation.  TM Distance: Normal, at least 6 cm  Jaw/Neck Findings:  Neck ROM: Normal ROM      Dental:  Dental Findings: Upper Dentures    Chest/Lungs:  Chest/Lungs Findings: Normal Respiratory Rate     Heart/Vascular:  Heart Findings: Rate: Normal        Mental Status:  Mental Status Findings:  Alert and Oriented         Anesthesia Plan  Type of Anesthesia, risks & benefits discussed:  Anesthesia Type:  MAC  Patient's Preference:   Intra-op Monitoring Plan: standard ASA monitors  Intra-op  Monitoring Plan Comments:   Post Op Pain Control Plan:   Post Op Pain Control Plan Comments:   Induction:   IV  Beta Blocker:  Patient is not currently on a Beta-Blocker (No further documentation required).       Informed Consent: Patient understands risks and agrees with Anesthesia plan.  Questions answered. Anesthesia consent signed with patient.  ASA Score: 3     Day of Surgery Review of History & Physical: I have interviewed and examined the patient. I have reviewed the patient's H&P dated:  There are no significant changes.          Ready For Surgery From Anesthesia Perspective.

## 2018-02-12 NOTE — TRANSFER OF CARE
"Anesthesia Transfer of Care Note    Patient: Kellie Mariscal    Procedure(s) Performed: Procedure(s) (LRB):  ESOPHAGOGASTRODUODENOSCOPY (EGD) (N/A)    Patient location: GI    Anesthesia Type: MAC    Transport from OR: Transported from OR on room air with adequate spontaneous ventilation    Post pain: adequate analgesia    Post assessment: no apparent anesthetic complications    Post vital signs: stable    Level of consciousness: awake, alert and oriented    Nausea/Vomiting: no nausea/vomiting    Complications: none    Transfer of care protocol was followed      Last vitals:   Visit Vitals  /81 (BP Location: Right arm, Patient Position: Lying)   Pulse (!) 50   Temp 36.7 °C (98 °F) (Oral)   Resp 20   Ht 5' 4" (1.626 m)   Wt 106.1 kg (234 lb)   SpO2 100%   Breastfeeding? No   BMI 40.17 kg/m²     "

## 2018-02-12 NOTE — ANESTHESIA RELEASE NOTE
"Anesthesia Release from PACU Note    Patient: Kellie Mariscal    Procedure(s) Performed: Procedure(s) (LRB):  ESOPHAGOGASTRODUODENOSCOPY (EGD) (N/A)    Anesthesia type: MAC    Post pain: Adequate analgesia    Post assessment: no apparent anesthetic complications, tolerated procedure well and no evidence of recall    Last Vitals:   Visit Vitals  /73 (BP Location: Left arm, Patient Position: Lying)   Pulse (!) 54   Temp 36.7 °C (98 °F) (Oral)   Resp 18   Ht 5' 4" (1.626 m)   Wt 106.1 kg (234 lb)   SpO2 95%   Breastfeeding? No   BMI 40.17 kg/m²       Post vital signs: stable    Level of consciousness: awake, alert  and oriented    Nausea/Vomiting: no nausea/no vomiting    Complications: none    Airway Patency: patent    Respiratory: unassisted, spontaneous ventilation, room air    Cardiovascular: stable and blood pressure at baseline    Hydration: euvolemic  "

## 2018-02-12 NOTE — H&P (VIEW-ONLY)
Clinic Consult:  [facility] Consultation Note    Reason for Consult:  The primary encounter diagnosis was Gastroesophageal reflux disease, esophagitis presence not specified. A diagnosis of Sore throat was also pertinent to this visit.    PCP: Clara Villanueva   [address] [place] [address]  / [place] [id number]    HPI:  This is a 57 y.o. female here for evaluation of the above issues. She was referred to me by Dr. Shanks. She presents to clinic with complaints of chronic sore throat. Onset of symptoms began in April 2016 and have been intermittent since then. She has a history of GERD and has been on omeprazole 20mg twice a day without relief. She also reports some discomfort when eating but denies any dysphagia. ENT evaluation unrevealing. Recommended GI referral for evaluation of reflux as potential etiology of sore throat. No nausea or vomiting. She has been taking NSAIDs for throat pain.     Review of Systems   Constitutional: Negative for fever, malaise/fatigue and weight loss.   HENT: Positive for sore throat.    Respiratory: Negative for cough and wheezing.    Cardiovascular: Negative for chest pain and palpitations.   Gastrointestinal: Negative for abdominal pain, blood in stool, constipation, diarrhea, melena, nausea and vomiting.   Genitourinary: Negative for dysuria and frequency.   Musculoskeletal: Negative for back pain, joint pain, myalgias and neck pain.   Skin: Negative for itching and rash.   Neurological: Negative for dizziness, speech change, seizures, loss of consciousness and headaches.   Psychiatric/Behavioral: Negative for depression and substance abuse. The patient is not nervous/anxious.        Medical History:  has a past medical history of CAD (coronary artery disease); Hyperlipidemia; Hypertension; and Thyroid nodule.    Surgical History:  has a past surgical history that includes Cholecystectomy; Appendectomy; and Coronary artery bypass graft.    Family History: family history  "includes Heart disease in her father; Hypertension in her father; No Known Problems in her mother..     Social History:  reports that she has never smoked. She has never used smokeless tobacco. She reports that she does not drink alcohol.    Allergies: Reviewed    Home Medications:   Current Outpatient Prescriptions on File Prior to Visit   Medication Sig Dispense Refill    aspirin 325 MG tablet Take 325 mg by mouth once daily.      azilsartan medoxomil (EDARBI) 80 mg Tab Take 1 tablet by mouth once daily.      cloNIDine (CATAPRES) 0.2 MG tablet Take 0.2 mg by mouth every evening.      fluticasone (FLONASE) 50 mcg/actuation nasal spray 1 spray (50 mcg total) by Each Nare route once daily. 16 g 0    furosemide (LASIX) 40 MG tablet Take 40 mg by mouth once daily.      POTASSIUM CHLORIDE (KLOR-CON ORAL) Take 1 tablet by mouth once daily.       No current facility-administered medications on file prior to visit.        Physical Exam:  Vital Signs:  BP (!) 152/78   Pulse 66   Ht 5' 4.8" (1.646 m)   Wt 107.8 kg (237 lb 10.5 oz)   BMI 39.79 kg/m²   Body mass index is 39.79 kg/m².  Physical Exam   Constitutional: She is oriented to person, place, and time and well-developed, well-nourished, and in no distress. No distress.   HENT:   Head: Normocephalic.   Eyes: Conjunctivae are normal. Pupils are equal, round, and reactive to light.   Cardiovascular: Normal rate, regular rhythm and normal heart sounds.    Pulmonary/Chest: Effort normal and breath sounds normal. No respiratory distress.   Abdominal: Soft. Bowel sounds are normal. She exhibits no distension. There is no tenderness.   Neurological: She is alert and oriented to person, place, and time. No cranial nerve deficit.   Skin: Skin is warm and dry. No rash noted.   Psychiatric: Mood and affect normal.       Labs: Pertinent labs reviewed.    Endoscopy:  None prior    CRC Screening: per patient, up to date. Done at external facility    Assessment:  1. " Gastroesophageal reflux disease, esophagitis presence not specified    2. Sore throat           Recommendations:  Gastroesophageal reflux disease, esophagitis presence not specified  - continue omeprazole but refilled medication with increased dose.   - add zantac  - unclear if etiology of store throat  - EGD needed for further evaluation  -     omeprazole (PRILOSEC) 40 MG capsule; Take 1 capsule (40 mg total) by mouth once daily.  Dispense: 30 capsule; Refill: 5  -     ranitidine (ZANTAC) 150 MG tablet; Take 1 tablet (150 mg total) by mouth 2 (two) times daily.  Dispense: 60 tablet; Refill: 5  -     Case request GI: ESOPHAGOGASTRODUODENOSCOPY (EGD)    Sore throat  - unclear etiology  - plan for EGD  - follow up in 6 weeks.   -     Case request GI: ESOPHAGOGASTRODUODENOSCOPY (EGD)    Follow-up in about 6 weeks (around 3/20/2018).    Thank you so much for allowing me to participate in the care of LINA Miramontes

## 2018-02-15 ENCOUNTER — PATIENT MESSAGE (OUTPATIENT)
Dept: GASTROENTEROLOGY | Facility: CLINIC | Age: 58
End: 2018-02-15

## 2018-02-19 ENCOUNTER — PATIENT MESSAGE (OUTPATIENT)
Dept: GASTROENTEROLOGY | Facility: CLINIC | Age: 58
End: 2018-02-19

## 2018-03-14 ENCOUNTER — TELEPHONE (OUTPATIENT)
Dept: GASTROENTEROLOGY | Facility: CLINIC | Age: 58
End: 2018-03-14

## 2018-03-14 NOTE — TELEPHONE ENCOUNTER
Patient is having sore throat.  When she had her procedure the Dr told her she had ulcers, should she be having the pain?  She is taking both medication prilosec, and zantac

## 2018-03-14 NOTE — TELEPHONE ENCOUNTER
----- Message from Rere Garcia sent at 3/14/2018  3:22 PM CDT -----  Contact: self [phone]  Would like to consult with nurse regarding medication.  Please call back at [phone].  Md Vinay

## 2018-03-15 ENCOUNTER — TELEPHONE (OUTPATIENT)
Dept: GASTROENTEROLOGY | Facility: CLINIC | Age: 58
End: 2018-03-15

## 2018-03-15 RX ORDER — SUCRALFATE 1 G/1
1 TABLET ORAL 4 TIMES DAILY
Qty: 120 TABLET | Refills: 2 | Status: ON HOLD | OUTPATIENT
Start: 2018-03-15 | End: 2018-09-14 | Stop reason: HOSPADM

## 2018-03-15 NOTE — TELEPHONE ENCOUNTER
Spoke with patient and gave instructions on her mediacation change and patient verbalized understanding.

## 2018-03-15 NOTE — TELEPHONE ENCOUNTER
Because she is still having symptoms along with the diagnosis of esophageal and gastric ulcers, we will need to adjust her medication. She should take omeprazole 40mg twice a day instead of once a day. I will also add Carafate to be taken four times a day. She may continue Zantac. I will send in Carafate now to [facility] in [place]. Please let me know if she desires a new prescription for omeprazole since dose was adjusted. She should follow up with me at her already scheduled appointment.

## 2018-03-15 NOTE — TELEPHONE ENCOUNTER
----- Message from Lanie Villa MA sent at 3/15/2018  8:58 AM CDT -----  Contact: pT   Patient calling from yesterday for instructions.  ----- Message -----  From: Nannette Jaramillo  Sent: 3/15/2018   8:48 AM  To: Jeanette Mckenzie Staff    Pt called to follow up on conversation she had with the nurse on yesterday regarding medication callback number is..[phone] (home)

## 2018-03-27 ENCOUNTER — OFFICE VISIT (OUTPATIENT)
Dept: GASTROENTEROLOGY | Facility: CLINIC | Age: 58
End: 2018-03-27
Payer: COMMERCIAL

## 2018-03-27 VITALS
WEIGHT: 231.25 LBS | HEART RATE: 54 BPM | SYSTOLIC BLOOD PRESSURE: 98 MMHG | DIASTOLIC BLOOD PRESSURE: 68 MMHG | HEIGHT: 65 IN | BODY MASS INDEX: 38.53 KG/M2

## 2018-03-27 DIAGNOSIS — K25.9 GASTRIC ULCER WITHOUT HEMORRHAGE OR PERFORATION, UNSPECIFIED CHRONICITY: ICD-10-CM

## 2018-03-27 DIAGNOSIS — K22.10 ULCER OF ESOPHAGUS WITHOUT BLEEDING: Primary | ICD-10-CM

## 2018-03-27 PROCEDURE — 99214 OFFICE O/P EST MOD 30 MIN: CPT | Mod: S$GLB,,, | Performed by: NURSE PRACTITIONER

## 2018-03-27 PROCEDURE — 99999 PR PBB SHADOW E&M-EST. PATIENT-LVL III: CPT | Mod: PBBFAC,,, | Performed by: NURSE PRACTITIONER

## 2018-03-27 PROCEDURE — 3074F SYST BP LT 130 MM HG: CPT | Mod: CPTII,S$GLB,, | Performed by: NURSE PRACTITIONER

## 2018-03-27 PROCEDURE — 3078F DIAST BP <80 MM HG: CPT | Mod: CPTII,S$GLB,, | Performed by: NURSE PRACTITIONER

## 2018-03-29 NOTE — PROGRESS NOTES
Clinic Follow Up:  [facility] Follow Up Note    Reason for Follow Up:  The primary encounter diagnosis was Ulcer of esophagus without bleeding. A diagnosis of Gastric ulcer without hemorrhage or perforation, unspecified chronicity was also pertinent to this visit.    PCP: Clara Villanueva       HPI:  This is a 57 y.o. female here for follow up of the above. She had recent EGD which revealed esophageal and gastric ulcer. She is on daily PPI and carafate. She will need repeat EGD in 3 months for mucosal healing. She reports improvements in sore throat. She is not having any GI symptoms currently.     Review of Systems   Constitutional: Negative for activity change and appetite change.        As per interval history above   Respiratory: Negative for cough and shortness of breath.    Cardiovascular: Negative for chest pain.   Gastrointestinal: Negative for abdominal pain, blood in stool, constipation, diarrhea, nausea and vomiting.   Skin: Negative for color change and rash.       Medical History:  Past Medical History:   Diagnosis Date    CAD (coronary artery disease)     Dr mccormick    Diverticulosis     Hyperlipidemia     Hypertension     Thyroid nodule        Surgical History:   Past Surgical History:   Procedure Laterality Date    APPENDECTOMY      CHOLECYSTECTOMY      CORONARY ARTERY BYPASS GRAFT      HYSTERECTOMY         Family History:   Family History   Problem Relation Age of Onset    No Known Problems Mother     Heart disease Father     Hypertension Father     Migraines Neg Hx     Seizures Neg Hx     Thyroid disease Neg Hx     Asthma Neg Hx        Social History:   Social History   Substance Use Topics    Smoking status: Never Smoker    Smokeless tobacco: Never Used    Alcohol use No       Allergies:   Review of patient's allergies indicates:   Allergen Reactions    Codeine Itching    Pcn [penicillins] Itching    Sulfa (sulfonamide antibiotics) Itching       Home  "Medications:  Current Outpatient Prescriptions on File Prior to Visit   Medication Sig Dispense Refill    aspirin 325 MG tablet Take 325 mg by mouth once daily.      azilsartan medoxomil (EDARBI) 80 mg Tab Take 1 tablet by mouth once daily.      cloNIDine (CATAPRES) 0.2 MG tablet Take 0.2 mg by mouth every evening.      fluticasone (FLONASE) 50 mcg/actuation nasal spray 1 spray (50 mcg total) by Each Nare route once daily. 16 g 0    furosemide (LASIX) 40 MG tablet Take 40 mg by mouth once daily.      omeprazole (PRILOSEC) 40 MG capsule Take 1 capsule (40 mg total) by mouth once daily. 30 capsule 5    POTASSIUM CHLORIDE (KLOR-CON ORAL) Take 1 tablet by mouth once daily.      ranitidine (ZANTAC) 150 MG tablet Take 1 tablet (150 mg total) by mouth 2 (two) times daily. 60 tablet 5    sucralfate (CARAFATE) 1 gram tablet Take 1 tablet (1 g total) by mouth 4 (four) times daily. 120 tablet 2     No current facility-administered medications on file prior to visit.        BP 98/68   Pulse (!) 54   Ht 5' 4.8" (1.646 m)   Wt 104.9 kg (231 lb 4.2 oz)   BMI 38.72 kg/m²   Body mass index is 38.72 kg/m².  Physical Exam   Constitutional: She is oriented to person, place, and time and well-developed, well-nourished, and in no distress. No distress.   HENT:   Head: Normocephalic.   Eyes: Conjunctivae are normal. Pupils are equal, round, and reactive to light.   Cardiovascular: Normal rate, regular rhythm and normal heart sounds.    Pulmonary/Chest: Effort normal and breath sounds normal. No respiratory distress.   Abdominal: Soft. Bowel sounds are normal. She exhibits no distension. There is no tenderness.   Neurological: She is alert and oriented to person, place, and time. No cranial nerve deficit.   Skin: Skin is warm and dry. No rash noted.   Psychiatric: Mood and affect normal.       Labs: Pertinent labs reviewed.    Assessment:  1. Ulcer of esophagus without bleeding    2. Gastric ulcer without hemorrhage or " perforation, unspecified chronicity        Recommendations:  - symptoms improving.   - continue current medications  - repeat EGD in 3 months.  - follow up after repeat EGD.   -     Case request GI: ESOPHAGOGASTRODUODENOSCOPY (EGD)    Thank you for the opportunity to participate in the care of this patient.  LINA Ragland

## 2018-05-21 ENCOUNTER — PATIENT MESSAGE (OUTPATIENT)
Dept: GASTROENTEROLOGY | Facility: CLINIC | Age: 58
End: 2018-05-21

## 2018-06-15 DIAGNOSIS — Z12.39 BREAST CANCER SCREENING: ICD-10-CM

## 2018-07-11 ENCOUNTER — PATIENT MESSAGE (OUTPATIENT)
Dept: GASTROENTEROLOGY | Facility: CLINIC | Age: 58
End: 2018-07-11

## 2018-07-19 ENCOUNTER — OFFICE VISIT (OUTPATIENT)
Dept: GASTROENTEROLOGY | Facility: CLINIC | Age: 58
End: 2018-07-19
Payer: COMMERCIAL

## 2018-07-19 VITALS
DIASTOLIC BLOOD PRESSURE: 60 MMHG | HEIGHT: 64 IN | WEIGHT: 232.13 LBS | SYSTOLIC BLOOD PRESSURE: 120 MMHG | BODY MASS INDEX: 39.63 KG/M2 | HEART RATE: 60 BPM

## 2018-07-19 DIAGNOSIS — K25.3 ACUTE GASTRIC ULCER WITHOUT HEMORRHAGE OR PERFORATION: ICD-10-CM

## 2018-07-19 DIAGNOSIS — K22.10 ULCER OF ESOPHAGUS WITHOUT BLEEDING: Primary | ICD-10-CM

## 2018-07-19 PROCEDURE — 3078F DIAST BP <80 MM HG: CPT | Mod: CPTII,S$GLB,, | Performed by: NURSE PRACTITIONER

## 2018-07-19 PROCEDURE — 99999 PR PBB SHADOW E&M-EST. PATIENT-LVL III: CPT | Mod: PBBFAC,,, | Performed by: NURSE PRACTITIONER

## 2018-07-19 PROCEDURE — 3074F SYST BP LT 130 MM HG: CPT | Mod: CPTII,S$GLB,, | Performed by: NURSE PRACTITIONER

## 2018-07-19 PROCEDURE — 3008F BODY MASS INDEX DOCD: CPT | Mod: CPTII,S$GLB,, | Performed by: NURSE PRACTITIONER

## 2018-07-19 PROCEDURE — 99214 OFFICE O/P EST MOD 30 MIN: CPT | Mod: S$GLB,,, | Performed by: NURSE PRACTITIONER

## 2018-07-19 RX ORDER — OMEPRAZOLE 40 MG/1
40 CAPSULE, DELAYED RELEASE ORAL
Qty: 60 CAPSULE | Refills: 1 | Status: SHIPPED | OUTPATIENT
Start: 2018-07-19 | End: 2020-04-20

## 2018-07-23 NOTE — PROGRESS NOTES
Clinic Follow Up:  [facility] Follow Up Note    Reason for Follow Up:  The primary encounter diagnosis was Ulcer of esophagus without bleeding. A diagnosis of Acute gastric ulcer without hemorrhage or perforation was also pertinent to this visit.    PCP: Clara Villanueva       HPI:  This is a 58 y.o. female here for follow up of the above. She is known to me from previous encounters. She has a history of esophageal and gastric ulcer on EGD dated 2/12/18. She saw me post EGD and had resolution of symptoms. She was supposed to repeat EGD but was not done. Today, she presents with complaints of sore throat. She has not been on PPI.     Review of Systems   Constitutional: Negative for activity change and appetite change.        As per interval history above   HENT: Positive for sore throat.    Respiratory: Negative for cough and shortness of breath.    Cardiovascular: Negative for chest pain.   Gastrointestinal: Negative for abdominal pain, blood in stool, constipation, diarrhea, nausea and vomiting.        Heartburn   Skin: Negative for color change and rash.       Medical History:  Past Medical History:   Diagnosis Date    CAD (coronary artery disease)     Dr mccormick    Diverticulosis     Hyperlipidemia     Hypertension     Thyroid nodule        Surgical History:   Past Surgical History:   Procedure Laterality Date    APPENDECTOMY      CHOLECYSTECTOMY      CORONARY ARTERY BYPASS GRAFT      HYSTERECTOMY         Family History:   Family History   Problem Relation Age of Onset    No Known Problems Mother     Heart disease Father     Hypertension Father     Migraines Neg Hx     Seizures Neg Hx     Thyroid disease Neg Hx     Asthma Neg Hx        Social History:   Social History   Substance Use Topics    Smoking status: Never Smoker    Smokeless tobacco: Never Used    Alcohol use No       Allergies:   Review of patient's allergies indicates:   Allergen Reactions    Codeine Itching     "Pcn [penicillins] Itching    Sulfa (sulfonamide antibiotics) Itching       Home Medications:  Current Outpatient Prescriptions on File Prior to Visit   Medication Sig Dispense Refill    aspirin 325 MG tablet Take 325 mg by mouth once daily.      azilsartan medoxomil (EDARBI) 80 mg Tab Take 1 tablet by mouth once daily.      cloNIDine (CATAPRES) 0.2 MG tablet Take 0.2 mg by mouth every evening.      furosemide (LASIX) 40 MG tablet Take 40 mg by mouth once daily.      POTASSIUM CHLORIDE (KLOR-CON ORAL) Take 1 tablet by mouth once daily.      ranitidine (ZANTAC) 150 MG tablet Take 1 tablet (150 mg total) by mouth 2 (two) times daily. 60 tablet 5    sucralfate (CARAFATE) 1 gram tablet Take 1 tablet (1 g total) by mouth 4 (four) times daily. 120 tablet 2    fluticasone (FLONASE) 50 mcg/actuation nasal spray 1 spray (50 mcg total) by Each Nare route once daily. 16 g 0     No current facility-administered medications on file prior to visit.        /60   Pulse 60   Ht 5' 4" (1.626 m)   Wt 105.3 kg (232 lb 2.3 oz)   BMI 39.85 kg/m²   Body mass index is 39.85 kg/m².  Physical Exam   Constitutional: She is oriented to person, place, and time and well-developed, well-nourished, and in no distress. No distress.   HENT:   Head: Normocephalic.   Eyes: Conjunctivae are normal. Pupils are equal, round, and reactive to light.   Cardiovascular: Normal rate, regular rhythm and normal heart sounds.    Pulmonary/Chest: Effort normal and breath sounds normal. No respiratory distress.   Abdominal: Soft. Bowel sounds are normal. She exhibits no distension. There is no tenderness.   Neurological: She is alert and oriented to person, place, and time. No cranial nerve deficit.   Skin: Skin is warm and dry. No rash noted.   Psychiatric: Mood and affect normal.       Labs: Pertinent labs reviewed.    Assessment:  1. Ulcer of esophagus without bleeding    2. Acute gastric ulcer without hemorrhage or perforation  "       Recommendations:  Ulcer of esophagus without bleeding  Acute gastric ulcer without hemorrhage or perforation  - needs repeat EGD  - order previously placed  - restart PPI BID  Other orders  -     omeprazole (PRILOSEC) 40 MG capsule; Take 1 capsule (40 mg total) by mouth 2 (two) times daily before meals.  Dispense: 60 capsule; Refill: 1    Return to Clinic:  Follow up to be determined after procedure.    Thank you for the opportunity to participate in the care of this patient.  LINA Ragland

## 2018-08-29 ENCOUNTER — DOCUMENTATION ONLY (OUTPATIENT)
Dept: ENDOSCOPY | Facility: HOSPITAL | Age: 58
End: 2018-08-29

## 2018-09-05 ENCOUNTER — PATIENT OUTREACH (OUTPATIENT)
Dept: ADMINISTRATIVE | Facility: HOSPITAL | Age: 58
End: 2018-09-05

## 2018-09-05 NOTE — Clinical Note
Dr. Villanueva, Patient has history CAD status post CABG and [employer] recommends statin use for patients with Ischemic Vascular Disease diagnosis.  This patient is not currently on documented statin therapy or allergy to statin noted. Patient was on report sent by Dr. Inocencio Reyna due to [facility] IVD [facility].Please advise how you would like to proceed.  Thank you, Yolis Cleary LPN Care Coordination Supervisor

## 2018-09-05 NOTE — PROGRESS NOTES
Patient has history CAD status post CABG and [employer] recommends statin use for patients with Ischemic Vascular Disease.  This patient is not currently on documented statin therapy. Message sent to PCP to review for statin need due to IVD diagnosis.

## 2018-09-14 ENCOUNTER — ANESTHESIA EVENT (OUTPATIENT)
Dept: ENDOSCOPY | Facility: HOSPITAL | Age: 58
End: 2018-09-14
Payer: COMMERCIAL

## 2018-09-14 ENCOUNTER — ANESTHESIA (OUTPATIENT)
Dept: ENDOSCOPY | Facility: HOSPITAL | Age: 58
End: 2018-09-14
Payer: COMMERCIAL

## 2018-09-14 ENCOUNTER — HOSPITAL ENCOUNTER (OUTPATIENT)
Facility: HOSPITAL | Age: 58
Discharge: HOME | End: 2018-09-14
Attending: INTERNAL MEDICINE | Admitting: INTERNAL MEDICINE
Payer: COMMERCIAL

## 2018-09-14 VITALS
SYSTOLIC BLOOD PRESSURE: 141 MMHG | RESPIRATION RATE: 19 BRPM | TEMPERATURE: 97 F | DIASTOLIC BLOOD PRESSURE: 72 MMHG | HEART RATE: 51 BPM | WEIGHT: 228 LBS | BODY MASS INDEX: 38.93 KG/M2 | OXYGEN SATURATION: 96 % | HEIGHT: 64 IN

## 2018-09-14 DIAGNOSIS — K25.7 CHRONIC GASTRIC ULCER WITHOUT HEMORRHAGE AND WITHOUT PERFORATION: Primary | ICD-10-CM

## 2018-09-14 DIAGNOSIS — K21.00 GASTROESOPHAGEAL REFLUX DISEASE WITH ESOPHAGITIS: ICD-10-CM

## 2018-09-14 DIAGNOSIS — K25.9 GASTRIC ULCER: ICD-10-CM

## 2018-09-14 PROCEDURE — 27201012 HC FORCEPS, HOT/COLD, DISP: Performed by: INTERNAL MEDICINE

## 2018-09-14 PROCEDURE — 43239 EGD BIOPSY SINGLE/MULTIPLE: CPT | Performed by: INTERNAL MEDICINE

## 2018-09-14 PROCEDURE — 37000009 HC ANESTHESIA EA ADD 15 MINS: Performed by: INTERNAL MEDICINE

## 2018-09-14 PROCEDURE — 37000008 HC ANESTHESIA 1ST 15 MINUTES: Performed by: INTERNAL MEDICINE

## 2018-09-14 PROCEDURE — 63600175 PHARM REV CODE 636 W HCPCS: Performed by: NURSE ANESTHETIST, CERTIFIED REGISTERED

## 2018-09-14 PROCEDURE — 88305 TISSUE EXAM BY PATHOLOGIST: CPT | Performed by: PATHOLOGY

## 2018-09-14 PROCEDURE — 88305 TISSUE EXAM BY PATHOLOGIST: CPT | Mod: 26,,, | Performed by: PATHOLOGY

## 2018-09-14 PROCEDURE — 43239 EGD BIOPSY SINGLE/MULTIPLE: CPT | Mod: ,,, | Performed by: INTERNAL MEDICINE

## 2018-09-14 PROCEDURE — 25000003 PHARM REV CODE 250: Performed by: INTERNAL MEDICINE

## 2018-09-14 PROCEDURE — 25000003 PHARM REV CODE 250: Performed by: NURSE ANESTHETIST, CERTIFIED REGISTERED

## 2018-09-14 RX ORDER — SODIUM CHLORIDE, SODIUM LACTATE, POTASSIUM CHLORIDE, CALCIUM CHLORIDE 600; 310; 30; 20 MG/100ML; MG/100ML; MG/100ML; MG/100ML
INJECTION, SOLUTION INTRAVENOUS ONCE
Status: COMPLETED | OUTPATIENT
Start: 2018-09-14 | End: 2018-09-14

## 2018-09-14 RX ORDER — PROPOFOL 10 MG/ML
VIAL (ML) INTRAVENOUS
Status: DISCONTINUED | OUTPATIENT
Start: 2018-09-14 | End: 2018-09-14

## 2018-09-14 RX ORDER — SODIUM CHLORIDE, SODIUM LACTATE, POTASSIUM CHLORIDE, CALCIUM CHLORIDE 600; 310; 30; 20 MG/100ML; MG/100ML; MG/100ML; MG/100ML
INJECTION, SOLUTION INTRAVENOUS CONTINUOUS
Status: CANCELLED | OUTPATIENT
Start: 2018-09-14

## 2018-09-14 RX ORDER — SODIUM CHLORIDE 0.9 % (FLUSH) 0.9 %
3 SYRINGE (ML) INJECTION
Status: CANCELLED | OUTPATIENT
Start: 2018-09-14

## 2018-09-14 RX ORDER — GLYCOPYRROLATE 0.2 MG/ML
INJECTION INTRAMUSCULAR; INTRAVENOUS
Status: DISCONTINUED | OUTPATIENT
Start: 2018-09-14 | End: 2018-09-14

## 2018-09-14 RX ORDER — LIDOCAINE HYDROCHLORIDE 10 MG/ML
INJECTION INFILTRATION; PERINEURAL
Status: DISCONTINUED | OUTPATIENT
Start: 2018-09-14 | End: 2018-09-14

## 2018-09-14 RX ORDER — SODIUM CHLORIDE, SODIUM LACTATE, POTASSIUM CHLORIDE, CALCIUM CHLORIDE 600; 310; 30; 20 MG/100ML; MG/100ML; MG/100ML; MG/100ML
INJECTION, SOLUTION INTRAVENOUS CONTINUOUS PRN
Status: DISCONTINUED | OUTPATIENT
Start: 2018-09-14 | End: 2018-09-14

## 2018-09-14 RX ADMIN — SODIUM CHLORIDE, SODIUM LACTATE, POTASSIUM CHLORIDE, AND CALCIUM CHLORIDE: 600; 310; 30; 20 INJECTION, SOLUTION INTRAVENOUS at 11:09

## 2018-09-14 RX ADMIN — LIDOCAINE HYDROCHLORIDE 1 ML: 10 INJECTION, SOLUTION INFILTRATION; PERINEURAL at 11:09

## 2018-09-14 RX ADMIN — GLYCOPYRROLATE 0.2 MG: 0.2 INJECTION INTRAMUSCULAR; INTRAVENOUS at 11:09

## 2018-09-14 RX ADMIN — SODIUM CHLORIDE, SODIUM LACTATE, POTASSIUM CHLORIDE, AND CALCIUM CHLORIDE: .6; .31; .03; .02 INJECTION, SOLUTION INTRAVENOUS at 11:09

## 2018-09-14 RX ADMIN — PROPOFOL 120 MG: 10 INJECTION, EMULSION INTRAVENOUS at 11:09

## 2018-09-14 RX ADMIN — PROPOFOL 50 MG: 10 INJECTION, EMULSION INTRAVENOUS at 12:09

## 2018-09-14 NOTE — DISCHARGE SUMMARY
[facility]  Brief Operative Note     SUMMARY     Surgery Date: 9/14/2018     Surgeon(s) and Role:     * Keegan Arvizu III, MD - Primary    Assisting Surgeon: None    Pre-op Diagnosis:  Ulcer of esophagus without bleeding [K22.10]  Gastric ulcer without hemorrhage or perforation, unspecified chronicity [K25.9]    Post-op Diagnosis:  Post-Op Diagnosis Codes:     * Ulcer of esophagus without bleeding [K22.10]     * Gastric ulcer without hemorrhage or perforation, unspecified chronicity [K25.9]      - Gastritis  Procedure(s) (LRB):  ESOPHAGOGASTRODUODENOSCOPY (EGD) (N/A)    Anesthesia: Monitor Anesthesia Care    Description of the findings of the procedure: Procedures completed. See Procedure note for full details.    Findings/Key Components: Procedures completed. See Procedure note for full details.    Prosthetics/Devices: None    Estimated Blood Loss: * No values recorded between 9/14/2018 12:00 AM and 9/14/2018 12:12 PM *         Specimens:   Specimen (12h ago, onward)    Start     Ordered    09/14/18 1202  Specimen to Pathology - Surgery  Once     Comments:  1. Antrum Bx for Gastritis     Start Status   09/14/18 1202 Collected (09/14/18 1204)       09/14/18 1202          Discharge Note    SUMMARY     Admit Date: 9/14/2018    Discharge Date and Time: 9/14/2018    Hospital Course (synopsis of major diagnoses, care, treatment, and services provided during the course of the hospital stay):  Procedures completed. See Procedure note for full details. Discharge patient when discharge criteria met.    Final Diagnosis: Post-Op Diagnosis Codes:     * Ulcer of esophagus without bleeding [K22.10]     * Gastric ulcer without hemorrhage or perforation, unspecified chronicity [K25.9]      - Gastritis  Disposition: Discharge patient when discharge criteria met.    Follow Up/Patient Instructions:       Medications:  Reconciled Home Medications:   Current Discharge Medication List      CONTINUE these medications  which have NOT CHANGED    Details   aspirin 325 MG tablet Take 325 mg by mouth once daily.      azilsartan medoxomil (EDARBI) 80 mg Tab Take 1 tablet by mouth once daily.      cloNIDine (CATAPRES) 0.2 MG tablet Take 0.2 mg by mouth every evening.      fluticasone (FLONASE) 50 mcg/actuation nasal spray 1 spray (50 mcg total) by Each Nare route once daily.  Qty: 16 g, Refills: 0    Associated Diagnoses: Acute non-recurrent maxillary sinusitis      furosemide (LASIX) 40 MG tablet Take 40 mg by mouth once daily.      omeprazole (PRILOSEC) 40 MG capsule Take 1 capsule (40 mg total) by mouth 2 (two) times daily before meals.  Qty: 60 capsule, Refills: 1      POTASSIUM CHLORIDE (KLOR-CON ORAL) Take 1 tablet by mouth once daily.      ranitidine (ZANTAC) 150 MG tablet Take 1 tablet (150 mg total) by mouth 2 (two) times daily.  Qty: 60 tablet, Refills: 5    Associated Diagnoses: Gastroesophageal reflux disease, esophagitis presence not specified         STOP taking these medications       sucralfate (CARAFATE) 1 gram tablet Comments:   Reason for Stopping:              No discharge procedures on file.

## 2018-09-14 NOTE — [employer] PATIENT INSTRUCTIONS
Discharge Summary/Instructions after an Endoscopic Procedure  Patient Name: Kellie Mariscal  Patient MRN: [id number]  Patient YOB: 1960 Friday, September 14, 2018 Keegan Arvizu III, MD  RESTRICTIONS:  During your procedure today, you received medications for sedation.  These   medications may affect your judgment, balance and coordination.  Therefore,   for 24 hours, you have the following restrictions:   - DO NOT drive a car, operate machinery, make legal/financial decisions,   sign important papers or drink alcohol.    ACTIVITY:  Today: no heavy lifting, straining or running due to procedural   sedation/anesthesia.  The following day: return to full activity including work.  DIET:  Eat and drink normally unless instructed otherwise.     TREATMENT FOR COMMON SIDE EFFECTS:  - Mild abdominal pain, nausea, belching, bloating or excessive gas:  rest,   eat lightly and use a heating pad.  - Sore Throat: treat with throat lozenges and/or gargle with warm salt   water.  - Because air was used during the procedure, expelling large amounts of air   from your rectum or belching is normal.  - If a bowel prep was taken, you may not have a bowel movement for 1-3 days.    This is normal.  SYMPTOMS TO WATCH FOR AND REPORT TO YOUR PHYSICIAN:  1. Abdominal pain or bloating, other than gas cramps.  2. Chest pain.  3. Back pain.  4. Signs of infection such as: chills or fever occurring within 24 hours   after the procedure.  5. Rectal bleeding, which would show as bright red, maroon, or black stools.   (A tablespoon of blood from the rectum is not serious, especially if   hemorrhoids are present.)  6. Vomiting.  7. Weakness or dizziness.  GO DIRECTLY TO THE NEAREST EMERGENCY ROOM IF YOU HAVE ANY OF THE FOLLOWING:      Difficulty breathing              Chills and/or fever over 101 F   Persistent vomiting and/or vomiting blood   Severe abdominal pain   Severe chest pain   Black, tarry stools   Bleeding- more than one  tablespoon   Any other symptom or condition that you feel may need urgent attention  Your doctor recommends these additional instructions:  If any biopsies were taken, your doctors clinic will contact you in 1 to 2   weeks with any results.  - Discharge patient to home (via wheelchair).   - Resume previous diet.   - Continue present medications.   - Await pathology results.   - Return to GI clinic PRN.  For questions, problems or results please call your physician Keegan Arvizu III, MD at Work:  [phone]  If you have any questions about the above instructions, call the GI   department at [phone] or call the endoscopy unit at [phone]   from 7am until 3 pm.  [facility], EMERGENCY ROOM PHONE NUMBER:   [phone]  IF A COMPLICATION OR EMERGENCY SITUATION ARISES AND YOU ARE UNABLE TO REACH   YOUR PHYSICIAN - GO DIRECTLY TO THE EMERGENCY ROOM.  I have read or have had read to me these discharge instructions for my   procedure and have received a written copy.  I understand these   instructions and will follow-up with my physician if I have any questions.     __________________________________       _____________________________________  Nurse Signature                                          Patient/Designated   Responsible Party Signature  Keegan Arvizu III, MD  9/14/2018 12:11:00 PM  This report has been verified and signed electronically.  [employer]

## 2018-09-14 NOTE — H&P
Short Stay Endoscopy History and Physical    PCP - Clara Villanueva MD    Procedure - EGD  ASA - 3  Mallampati - per anesthesia  History of Anesthesia problems - no  Family history Anesthesia problems -  no     HPI:  This is a 58 y.o.female here for evaluation of :Gastric Ulcer Surveillance     Reflux - yes  Dysphagia - no  Abdominal pain - no  Diarrhea - no  Anemia - no  GI bleeding - no  Nausea and vomiting-no  Early satiety-no  aversion to sight or smell of food-no    ROS:  Constitutional: No fevers, chills, No weight loss  ENT: No allergies  CV: No chest pain  Pulm: No cough, No shortness of breath  Ophtho: No vision changes  GI: see HPI  Derm: No rash  Heme: No lymphadenopathy, No bruising  MSK: No arthritis  : No dysuria, No hematuria  Endo: No hot or cold intolerance  Neuro: No syncope, No seizure  Psych: No anxiety, No depression    Medical History:  Past Medical History:   Diagnosis Date    CAD (coronary artery disease)     Dr mccormick    Diverticulosis     Hyperlipidemia     Hypertension     Thyroid nodule        Surgical History:  Past Surgical History:   Procedure Laterality Date    APPENDECTOMY      CHOLECYSTECTOMY      CORONARY ARTERY BYPASS GRAFT      ESOPHAGOGASTRODUODENOSCOPY (EGD) N/A 2/12/2018    Performed by Keegan Arvizu III, MD at [facility] ENDO    HYSTERECTOMY         Family History:  Family History   Problem Relation Age of Onset    No Known Problems Mother     Heart disease Father     Hypertension Father     Migraines Neg Hx     Seizures Neg Hx     Thyroid disease Neg Hx     Asthma Neg Hx        Social History:  Social History     Socioeconomic History    Marital status:      Spouse name: Not on file    Number of children: 2    Years of education: Not on file    Highest education level: Not on file   Social Needs    Financial resource strain: Not on file    Food insecurity - worry: Not on file    Food insecurity - inability: Not on file    Transportation  needs - medical: Not on file    Transportation needs - non-medical: Not on file   Occupational History    Occupation:     Tobacco Use    Smoking status: Never Smoker    Smokeless tobacco: Never Used   Substance and Sexual Activity    Alcohol use: No    Drug use: No    Sexual activity: Not on file   Other Topics Concern    Not on file   Social History Narrative    Not on file       Allergies:   Review of patient's allergies indicates:   Allergen Reactions    Codeine Itching    Pcn [penicillins] Itching    Sulfa (sulfonamide antibiotics) Itching       Medications:   No current facility-administered medications on file prior to encounter.      Current Outpatient Medications on File Prior to Encounter   Medication Sig Dispense Refill    aspirin 325 MG tablet Take 325 mg by mouth once daily.      azilsartan medoxomil (EDARBI) 80 mg Tab Take 1 tablet by mouth once daily.      cloNIDine (CATAPRES) 0.2 MG tablet Take 0.2 mg by mouth every evening.      fluticasone (FLONASE) 50 mcg/actuation nasal spray 1 spray (50 mcg total) by Each Nare route once daily. 16 g 0    furosemide (LASIX) 40 MG tablet Take 40 mg by mouth once daily.      POTASSIUM CHLORIDE (KLOR-CON ORAL) Take 1 tablet by mouth once daily.      ranitidine (ZANTAC) 150 MG tablet Take 1 tablet (150 mg total) by mouth 2 (two) times daily. 60 tablet 5    sucralfate (CARAFATE) 1 gram tablet Take 1 tablet (1 g total) by mouth 4 (four) times daily. 120 tablet 2       Objective Findings:    Vital Signs:There were no vitals filed for this visit.        Physical Exam:  General Appearance: Well appearing in no acute distress  Eyes:    No scleral icterus  ENT: Neck supple, Lips, mucosa, and tongue normal; teeth and gums normal  Lungs: CTA bilaterally in anterior and posterior fields, no wheezes, no crackles.  Heart:  Regular rate, S1, S2 normal, no murmurs heard.  Abdomen: Soft, non tender, non distended with normal bowel sounds. No  hepatosplenomegaly, ascites, or mass.  Extremities: No clubbing, cyanosis or edema  Skin: No rash    Labs:  Reviewed    Plan:EGD  I have explained the risks and benefits of endoscopy procedures to the patient including but not limited to bleeding, perforation, infection, and death. The patient wishes to proceed.

## 2018-09-14 NOTE — TRANSFER OF CARE
"Anesthesia Transfer of Care Note    Patient: Kellie Mariscal    Procedure(s) Performed: Procedure(s) (LRB):  ESOPHAGOGASTRODUODENOSCOPY (EGD) (N/A)    Patient location: GI    Anesthesia Type: MAC    Transport from OR: Transported from OR on room air with adequate spontaneous ventilation    Post pain: adequate analgesia    Post assessment: no apparent anesthetic complications    Post vital signs: stable    Level of consciousness: awake, alert and oriented    Nausea/Vomiting: no nausea/vomiting    Complications: none    Transfer of care protocol was followed      Last vitals:   Visit Vitals  BP (!) 141/77 (BP Location: Left arm, Patient Position: Lying)   Pulse (!) 18   Temp 36.3 °C (97.3 °F) (Oral)   Resp 18   Ht 5' 4" (1.626 m)   Wt 103.4 kg (228 lb)   SpO2 95%   Breastfeeding? No   BMI 39.14 kg/m²     "

## 2018-09-14 NOTE — DISCHARGE INSTRUCTIONS

## 2018-09-14 NOTE — OR NURSING
+++++    Z-line at 40cm.   1. Antrum Bx for Gastritis.Gastric ulcer healed. Patient tolerated procedure well.

## 2018-09-14 NOTE — INTERVAL H&P NOTE
The patient has been examined and the H&P has been reviewed:I have reviewed this note and I agree with this assessment. The patient remains stable for endoscopy at the time of this present evaluation.       Anesthesia/Surgery risks, benefits and alternative options discussed and understood by patient/family.          There are no hospital problems to display for this patient.

## 2018-09-14 NOTE — ANESTHESIA RELEASE NOTE
"Anesthesia Release from PACU Note    Patient: Kellie Mariscal    Procedure(s) Performed: Procedure(s) (LRB):  ESOPHAGOGASTRODUODENOSCOPY (EGD) (N/A)    Anesthesia type: MAC    Post pain: Adequate analgesia    Post assessment: no apparent anesthetic complications, tolerated procedure well and no evidence of recall    Last Vitals:   Visit Vitals  BP (!) 141/77 (BP Location: Left arm, Patient Position: Lying)   Pulse (!) 18   Temp 36.3 °C (97.3 °F) (Oral)   Resp 18   Ht 5' 4" (1.626 m)   Wt 103.4 kg (228 lb)   SpO2 95%   Breastfeeding? No   BMI 39.14 kg/m²       Post vital signs: stable    Level of consciousness: awake    Nausea/Vomiting: no nausea/no vomiting    Complications: none    Airway Patency: patent    Respiratory: unassisted    Cardiovascular: stable and blood pressure at baseline    Hydration: euvolemic  "

## 2018-09-14 NOTE — ANESTHESIA POSTPROCEDURE EVALUATION
"Anesthesia Post Evaluation    Patient: Kellie Mariscal    Procedure(s) Performed: Procedure(s) (LRB):  ESOPHAGOGASTRODUODENOSCOPY (EGD) (N/A)    Final Anesthesia Type: MAC  Patient location during evaluation: GI PACU  Patient participation: Yes- Able to Participate  Level of consciousness: awake and alert  Post-procedure vital signs: reviewed and stable  Pain management: adequate  Airway patency: patent  PONV status at discharge: No PONV  Anesthetic complications: no      Cardiovascular status: blood pressure returned to baseline  Respiratory status: unassisted  Hydration status: euvolemic  Follow-up not needed.        Visit Vitals  BP (!) 141/77 (BP Location: Left arm, Patient Position: Lying)   Pulse (!) 18   Temp 36.3 °C (97.3 °F) (Oral)   Resp 18   Ht 5' 4" (1.626 m)   Wt 103.4 kg (228 lb)   SpO2 95%   Breastfeeding? No   BMI 39.14 kg/m²       Pain/Dane Score: Pain Assessment Performed: Yes (9/14/2018 11:10 AM)  Presence of Pain: denies (9/14/2018 11:10 AM)        "

## 2018-09-14 NOTE — ANESTHESIA PREPROCEDURE EVALUATION
09/14/2018  Kellie Mariscal is a 58 y.o., female.    Anesthesia Evaluation    I have reviewed the Patient Summary Reports.    I have reviewed the Nursing Notes.   I have reviewed the Medications.     Review of Systems  Anesthesia Hx:  No problems with previous Anesthesia  Denies Family Hx of Anesthesia complications.   Denies Personal Hx of Anesthesia complications.   Social:  Non-Smoker, No Alcohol Use    Hematology/Oncology:  Hematology Normal   Oncology Normal     EENT/Dental:EENT/Dental Normal   Cardiovascular:   Hypertension, well controlled CAD asymptomatic CABG/stent   Hypertension    Renal/:  Renal/ Normal     Hepatic/GI:   PUD, GERD        Physical Exam  General:  Morbid Obesity    Airway/Jaw/Neck:  Airway Findings: Mouth Opening: Normal Tongue: Normal  General Airway Assessment: Adult  Mallampati: II  Improves to II with phonation.  TM Distance: Normal, at least 6 cm      Dental:  Dental Findings: In tact, lower partial dentures        Mental Status:  Mental Status Findings:  Cooperative         Anesthesia Plan  Type of Anesthesia, risks & benefits discussed:  Anesthesia Type:  MAC  Patient's Preference:   Intra-op Monitoring Plan:   Intra-op Monitoring Plan Comments:   Post Op Pain Control Plan:   Post Op Pain Control Plan Comments:   Induction:   IV  Beta Blocker:  Patient is not currently on a Beta-Blocker (No further documentation required).       Informed Consent: Patient understands risks and agrees with Anesthesia plan.  Questions answered. Anesthesia consent signed with patient.  ASA Score: 2     Day of Surgery Review of History & Physical: I have interviewed and examined the patient. I have reviewed the patient's H&P dated:  There are no significant changes.          Ready For Surgery From Anesthesia Perspective.

## 2018-09-25 ENCOUNTER — PATIENT MESSAGE (OUTPATIENT)
Dept: GASTROENTEROLOGY | Facility: CLINIC | Age: 58
End: 2018-09-25

## 2018-11-23 ENCOUNTER — IMAGING SERVICES (OUTPATIENT)
Dept: OTHER | Age: 58
End: 2018-11-23

## 2019-01-24 ENCOUNTER — IMAGING SERVICES (OUTPATIENT)
Dept: OTHER | Age: 59
End: 2019-01-24

## 2019-01-29 RX ORDER — SUCRALFATE 1 G/1
TABLET ORAL
Qty: 120 TABLET | Refills: 2 | OUTPATIENT
Start: 2019-01-29

## 2019-02-13 ENCOUNTER — PATIENT OUTREACH (OUTPATIENT)
Dept: ADMINISTRATIVE | Facility: HOSPITAL | Age: 59
End: 2019-02-13

## 2019-02-13 NOTE — PROGRESS NOTES
I have attempted without success to contact pt re scheduling an appt no answer, unable to leave Vm. (1ST Attempt)

## 2019-03-05 ENCOUNTER — PATIENT OUTREACH (OUTPATIENT)
Dept: ADMINISTRATIVE | Facility: HOSPITAL | Age: 59
End: 2019-03-05

## 2019-03-26 ENCOUNTER — PATIENT OUTREACH (OUTPATIENT)
Dept: ADMINISTRATIVE | Facility: HOSPITAL | Age: 59
End: 2019-03-26

## 2019-04-03 ENCOUNTER — PATIENT OUTREACH (OUTPATIENT)
Dept: ADMINISTRATIVE | Facility: HOSPITAL | Age: 59
End: 2019-04-03

## 2019-05-07 DIAGNOSIS — Z12.11 COLON CANCER SCREENING: ICD-10-CM

## 2019-05-13 ENCOUNTER — PATIENT OUTREACH (OUTPATIENT)
Dept: ADMINISTRATIVE | Facility: HOSPITAL | Age: 59
End: 2019-05-13

## 2019-05-21 ENCOUNTER — PATIENT OUTREACH (OUTPATIENT)
Dept: ADMINISTRATIVE | Facility: HOSPITAL | Age: 59
End: 2019-05-21

## 2019-05-21 NOTE — PROGRESS NOTES
Contacted patient to schedule annual pcp appointment and overdue fitkit. No voicemail,unable to leave a message.

## 2019-05-30 ENCOUNTER — TELEPHONE (OUTPATIENT)
Dept: ADMINISTRATIVE | Facility: HOSPITAL | Age: 59
End: 2019-05-30

## 2019-07-31 ENCOUNTER — PATIENT OUTREACH (OUTPATIENT)
Dept: ADMINISTRATIVE | Facility: HOSPITAL | Age: 59
End: 2019-07-31

## 2019-09-07 NOTE — TELEPHONE ENCOUNTER
----- Message from Herlinda Larkin sent at 7/28/2017  3:15 PM CDT -----  Contact: Pt  Pt called and stated she needed to speak to the nurse. She stated she has diarrhea due to her diverticulitis and needs to be squeezed in to Monday schedule. She can be reached at [phone].    Thanks,  TF      
Spoke to pt. Pt is scheduled for Monday 7/31/17 to see nic win. Pt verbalized understanding   
English

## 2019-09-19 ENCOUNTER — TELEPHONE (OUTPATIENT)
Dept: INTERNAL MEDICINE | Facility: CLINIC | Age: 59
End: 2019-09-19

## 2019-09-19 NOTE — TELEPHONE ENCOUNTER
----- Message from Rosette Garcia sent at 9/19/2019  9:57 AM CDT -----  Contact: PT   Type:  Same Day Appointment Request    Caller is requesting a same day appointment.  Caller declined first available appointment listed below.    Name of Caller: Pt   When is the first available appointment?09/25/19  Symptoms: possible shingles/ Rt side pain   Best Call Back Number:[phone]  Additional Information:  ER F/U

## 2020-04-17 ENCOUNTER — TELEPHONE (OUTPATIENT)
Dept: GASTROENTEROLOGY | Facility: CLINIC | Age: 60
End: 2020-04-17

## 2020-04-17 NOTE — TELEPHONE ENCOUNTER
Called patient and scheduled patient an joshua visit. Patient was told that the call will come from a restricted number. Patient verbalized understanding.

## 2020-04-17 NOTE — TELEPHONE ENCOUNTER
----- Message from Rere Garcia sent at 4/17/2020  4:16 PM CDT -----  Contact: self [phone]  ..Name of Caller Kellie Mariscal   Reason for Visit/Symptoms follow-up/throat pain  Best Contact Number or Confirm if Mychart Preferred call back [phone]  Preferred Date/Time of Appointment  Week of 04/20/20  Interested in Virtual Visit (yes/no)no  Additional Information

## 2020-04-20 ENCOUNTER — PATIENT MESSAGE (OUTPATIENT)
Dept: GASTROENTEROLOGY | Facility: CLINIC | Age: 60
End: 2020-04-20

## 2020-04-20 ENCOUNTER — OFFICE VISIT (OUTPATIENT)
Dept: GASTROENTEROLOGY | Facility: CLINIC | Age: 60
End: 2020-04-20
Payer: COMMERCIAL

## 2020-04-20 DIAGNOSIS — Z87.19 HISTORY OF ESOPHAGEAL ULCER: ICD-10-CM

## 2020-04-20 DIAGNOSIS — J02.9 SORE THROAT: Primary | ICD-10-CM

## 2020-04-20 PROCEDURE — 99213 OFFICE O/P EST LOW 20 MIN: CPT | Mod: 95,,, | Performed by: NURSE PRACTITIONER

## 2020-04-20 PROCEDURE — 99213 PR OFFICE/OUTPT VISIT, EST, LEVL III, 20-29 MIN: ICD-10-PCS | Mod: 95,,, | Performed by: NURSE PRACTITIONER

## 2020-04-20 RX ORDER — OMEPRAZOLE 40 MG/1
40 CAPSULE, DELAYED RELEASE ORAL DAILY
Qty: 30 CAPSULE | Refills: 3 | Status: SHIPPED | OUTPATIENT
Start: 2020-04-20 | End: 2020-08-07 | Stop reason: SDUPTHER

## 2020-04-20 RX ORDER — SUCRALFATE 1 G/1
1 TABLET ORAL
Qty: 120 TABLET | Refills: 0 | Status: SHIPPED | OUTPATIENT
Start: 2020-04-20

## 2020-04-20 NOTE — PROGRESS NOTES
Clinic Follow Up:  [facility] Follow Up Note    Reason for Follow Up:  The primary encounter diagnosis was Sore throat. A diagnosis of History of esophageal ulcer was also pertinent to this visit.    PCP: Clara Villanueva       The patient location is: [place]   The chief complaint leading to consultation is: Sore throat  Visit type: audio only  **Although EMR platform has video capability, due to patient-related factors, video is unable to be used. Telephone-only communication was used with patient consent.   Total time spent with patient: 15 minutes.   Each patient to whom he or she provides medical services by telemedicine is:  (1) informed of the relationship between the physician and patient and the respective role of any other health care provider with respect to management of the patient; and (2) notified that he or she may decline to receive medical services by telemedicine and may withdraw from such care at any time.    Notes:     HPI:  This is a 60 y.o. female here for follow up of the above. She was previously seen in 2018 for similar symptoms. She was found to have an esophogeal ulcer, it was subsequently treated and symptoms resolved until 3 weeks ago when her sore throat returned. She has pain with swallowing. She was seen for these symptoms and was negative for Strep. She has to use chloraseptic spray to eat.     Review of Systems   Constitutional: Negative for activity change and appetite change.        As per interval history above   HENT: Positive for sore throat.    Respiratory: Negative for cough and shortness of breath.    Cardiovascular: Negative for chest pain.   Gastrointestinal: Negative for abdominal pain, blood in stool, constipation, diarrhea, nausea and vomiting.   Skin: Negative for color change and rash.       Medical History:  Past Medical History:   Diagnosis Date    CAD (coronary artery disease)     Dr mccormick    Diverticulosis     Hyperlipidemia      Hypertension     Thyroid nodule        Surgical History:   Past Surgical History:   Procedure Laterality Date    APPENDECTOMY      CHOLECYSTECTOMY      CORONARY ARTERY BYPASS GRAFT      ESOPHAGOGASTRODUODENOSCOPY N/A 9/14/2018    Procedure: ESOPHAGOGASTRODUODENOSCOPY (EGD);  Surgeon: Keegan Arvizu III, MD;  [facility]: [facility];  Service: Endoscopy;  Laterality: N/A;    HYSTERECTOMY         Family History:   Family History   Problem Relation Age of Onset    No Known Problems Mother     Heart disease Father     Hypertension Father     Migraines Neg Hx     Seizures Neg Hx     Thyroid disease Neg Hx     Asthma Neg Hx        Social History:   Social History     Tobacco Use    Smoking status: Never Smoker    Smokeless tobacco: Never Used   Substance Use Topics    Alcohol use: No    Drug use: No       Allergies:   Review of patient's allergies indicates:   Allergen Reactions    Codeine Itching    Pcn [penicillins] Itching    Sulfa (sulfonamide antibiotics) Itching       Home Medications:  Current Outpatient Medications on File Prior to Visit   Medication Sig Dispense Refill    aspirin 325 MG tablet Take 325 mg by mouth once daily.      azilsartan medoxomil (EDARBI) 80 mg Tab Take 1 tablet by mouth once daily.      fluticasone (FLONASE) 50 mcg/actuation nasal spray 1 spray (50 mcg total) by Each Nare route once daily. 16 g 0    furosemide (LASIX) 40 MG tablet Take 40 mg by mouth once daily.      POTASSIUM CHLORIDE (KLOR-CON ORAL) Take 1 tablet by mouth once daily.      [DISCONTINUED] cloNIDine (CATAPRES) 0.2 MG tablet Take 0.2 mg by mouth every evening.      [DISCONTINUED] omeprazole (PRILOSEC) 40 MG capsule Take 1 capsule (40 mg total) by mouth 2 (two) times daily before meals. 60 capsule 1    [DISCONTINUED] ranitidine (ZANTAC) 150 MG tablet Take 1 tablet (150 mg total) by mouth 2 (two) times daily. 60 tablet 5     No current facility-administered medications on file prior to visit.         There were no vitals taken for this visit.  There is no height or weight on file to calculate BMI.  Physical Exam   Constitutional: No distress.   Psychiatric: Mood and affect normal.       Labs: Pertinent labs reviewed.    Assessment:  1. Sore throat    2. History of esophageal ulcer        Recommendations:  - will go ahead and start daily PPI and Carafate as this was the previous regimen that improved her symptoms  - she will notify me if symptoms worsen or do not improve  -     omeprazole (PRILOSEC) 40 MG capsule; Take 1 capsule (40 mg total) by mouth once daily.  Dispense: 30 capsule; Refill: 3  -     sucralfate (CARAFATE) 1 gram tablet; Take 1 tablet (1 g total) by mouth 4 (four) times daily before meals and nightly.  Dispense: 120 tablet; Refill: 0    Return to Clinic:  Follow up in about 3 months (around 7/20/2020).    Thank you for the opportunity to participate in the care of this patient.  LINA Ragland

## 2020-08-06 ENCOUNTER — TELEPHONE (OUTPATIENT)
Dept: OTOLARYNGOLOGY | Facility: CLINIC | Age: 60
End: 2020-08-06

## 2020-08-06 NOTE — TELEPHONE ENCOUNTER
----- Message from Madison Richards sent at 8/6/2020  3:14 PM CDT -----  Regarding: SORE THROAT  Contact: PATIENT  Type:  Needs Medical Advice    Who Called: PATIENT  Symptoms (please be specific): SORE THROAT   How long has patient had these symptoms:  YESTERDAY  Pharmacy name and phone #:    [facility]  [place], [place] - [id number] [facility] [address]  Phone: [phone] Fax: [phone]    Would the patient rather a call back or a response via [facility]? CALL  Best Call Back Number: [phone]  Additional Information: PLEASE CALL PATIENT TODAY ASAP

## 2020-08-07 ENCOUNTER — OFFICE VISIT (OUTPATIENT)
Dept: OTOLARYNGOLOGY | Facility: CLINIC | Age: 60
End: 2020-08-07
Payer: COMMERCIAL

## 2020-08-07 ENCOUNTER — TELEPHONE (OUTPATIENT)
Dept: OTOLARYNGOLOGY | Facility: CLINIC | Age: 60
End: 2020-08-07

## 2020-08-07 VITALS
SYSTOLIC BLOOD PRESSURE: 157 MMHG | BODY MASS INDEX: 37.8 KG/M2 | DIASTOLIC BLOOD PRESSURE: 92 MMHG | WEIGHT: 220.25 LBS | TEMPERATURE: 98 F | HEART RATE: 75 BPM

## 2020-08-07 DIAGNOSIS — J31.2 CHRONIC SORE THROAT: Primary | ICD-10-CM

## 2020-08-07 DIAGNOSIS — K21.9 GASTROESOPHAGEAL REFLUX DISEASE, ESOPHAGITIS PRESENCE NOT SPECIFIED: ICD-10-CM

## 2020-08-07 PROCEDURE — 3008F BODY MASS INDEX DOCD: CPT | Mod: CPTII,S$GLB,, | Performed by: PHYSICIAN ASSISTANT

## 2020-08-07 PROCEDURE — 3080F PR MOST RECENT DIASTOLIC BLOOD PRESSURE >= 90 MM HG: ICD-10-PCS | Mod: CPTII,S$GLB,, | Performed by: PHYSICIAN ASSISTANT

## 2020-08-07 PROCEDURE — 99999 PR PBB SHADOW E&M-EST. PATIENT-LVL III: ICD-10-PCS | Mod: PBBFAC,,, | Performed by: PHYSICIAN ASSISTANT

## 2020-08-07 PROCEDURE — 3077F PR MOST RECENT SYSTOLIC BLOOD PRESSURE >= 140 MM HG: ICD-10-PCS | Mod: CPTII,S$GLB,, | Performed by: PHYSICIAN ASSISTANT

## 2020-08-07 PROCEDURE — 99999 PR PBB SHADOW E&M-EST. PATIENT-LVL III: CPT | Mod: PBBFAC,,, | Performed by: PHYSICIAN ASSISTANT

## 2020-08-07 PROCEDURE — 99213 PR OFFICE/OUTPT VISIT, EST, LEVL III, 20-29 MIN: ICD-10-PCS | Mod: S$GLB,,, | Performed by: PHYSICIAN ASSISTANT

## 2020-08-07 PROCEDURE — 3080F DIAST BP >= 90 MM HG: CPT | Mod: CPTII,S$GLB,, | Performed by: PHYSICIAN ASSISTANT

## 2020-08-07 PROCEDURE — 3077F SYST BP >= 140 MM HG: CPT | Mod: CPTII,S$GLB,, | Performed by: PHYSICIAN ASSISTANT

## 2020-08-07 PROCEDURE — 99213 OFFICE O/P EST LOW 20 MIN: CPT | Mod: S$GLB,,, | Performed by: PHYSICIAN ASSISTANT

## 2020-08-07 PROCEDURE — 3008F PR BODY MASS INDEX (BMI) DOCUMENTED: ICD-10-PCS | Mod: CPTII,S$GLB,, | Performed by: PHYSICIAN ASSISTANT

## 2020-08-07 RX ORDER — OMEPRAZOLE 40 MG/1
40 CAPSULE, DELAYED RELEASE ORAL DAILY
Qty: 30 CAPSULE | Refills: 3 | Status: SHIPPED | OUTPATIENT
Start: 2020-08-07 | End: 2021-08-07

## 2020-08-07 NOTE — TELEPHONE ENCOUNTER
"Pt is very rude, and upset that she came in today and "paid 50$ for nothing" pt stated "she has no idea why she saw us today" I apologized for the way she is feeling but the pt has been scheduled for gastro which our provider has referred her to.      ----- Message from Ravin Lafleur sent at 8/7/2020  2:18 PM CDT -----  Regarding: Appointment  Contact: Pt  Pt called in regards to speaking with the staff in regards to her recent appointment. Pt stated that she was unclear about the appointment. Pt can be reached at [phone] (obye)       "

## 2020-08-07 NOTE — PROGRESS NOTES
Patient had a coopertive shift.    Patient did not require seclusion/restraints or  administration of emergency medications to manage behavior.    Damien Marsh did not participate in groups and was visible in the milieu.    Notable mental health symptoms during this shift: restless    Patient is working on these coping/social skills: going to group    Visitors during this shift included non.      Other information about this shift: pt woken up for community meeting but refused to go and was a little irritable for a bit. Pt otherwise paced around the milieu and like hanging out in the game room       05/10/18 2100   Behavioral Health   Hallucinations denies / not responding to hallucinations   Thinking poor concentration;distractable   Orientation person: oriented   Memory baseline memory   Insight poor   Judgement impaired   Eye Contact at examiner   Affect full range affect   Mood mood is calm   Physical Appearance/Attire appears stated age;attire appropriate to age and situation   Hygiene neglected grooming - unclean body, hair, teeth   Suicidality other (see comments)  (none stated or observed)   1. Wish to be Dead No   2. Non-Specific Active Suicidal Thoughts  No   Self Injury other (see comment)  (none stated or observed)   Elopement (no concerns)   Activity restless   Speech clear;coherent   Medication Sensitivity no stated side effects;no observed side effects   Psychomotor / Gait balanced;steady   Psycho Education   Type of Intervention 1:1 intervention   Response participates, initiates socially appropriate   Hours 0.5   Activities of Daily Living   Hygiene/Grooming prompts   Oral Hygiene prompts   Dress independent   Room Organization prompts        Subjective:       Patient ID: Kellie Mariscal is a 60 y.o. female.    Chief Complaint: Sore Throat    Patient is a very pleasant 60 year old female here to see me today for evaluation of intermittent throat pain.  She says that it has been an issue for years, most recently worse over past 2 weeks.  She has been here before in 2018 and 2016 with similar complaints, and she has had FFL which was normal.  She has been followed by GI in the past for reflux, esophageal ulcer and says she's been out of her reflux medicine for about 2 weeks.  She has noted some reflux and heartburn with certain foods.  She denies dysphagia to solids or liquids, but does have intermittent odynophagia.  She saw GI virtually in April.  No fever or respiratory distress.  She complains of excess mucus in her throat.     Review of Systems   Constitutional: Negative for activity change, appetite change and fever.   HENT: Positive for sore throat and trouble swallowing (painful). Negative for nasal congestion, ear discharge, postnasal drip and rhinorrhea.    Respiratory: Negative for shortness of breath and wheezing.    Gastrointestinal: Positive for diarrhea.         Objective:      Physical Exam  Vitals signs reviewed.   Constitutional:       General: She is not in acute distress.     Appearance: She is well-developed.   HENT:      Head: Normocephalic and atraumatic.      Right Ear: Tympanic membrane, ear canal and external ear normal. No middle ear effusion. Tympanic membrane is not erythematous.      Left Ear: Tympanic membrane, ear canal and external ear normal.  No middle ear effusion. Tympanic membrane is not erythematous.      Nose: Nose normal. No nasal deformity, septal deviation, mucosal edema or rhinorrhea.      Right Sinus: No maxillary sinus tenderness or frontal sinus tenderness.      Left Sinus: No maxillary sinus tenderness or frontal sinus tenderness.      Mouth/Throat:      Mouth: Mucous membranes are moist. Mucous  membranes are not pale and not dry.      Dentition: Has dentures (upper).      Pharynx: Uvula midline. No oropharyngeal exudate, posterior oropharyngeal erythema or uvula swelling.      Tonsils: No tonsillar exudate. 2+ on the right. 2+ on the left.   Eyes:      General: Lids are normal. No scleral icterus.     Extraocular Movements:      Right eye: Normal extraocular motion and no nystagmus.      Left eye: Normal extraocular motion and no nystagmus.      Conjunctiva/sclera: Conjunctivae normal.      Right eye: Right conjunctiva is not injected. No chemosis.     Left eye: Left conjunctiva is not injected. No chemosis.     Pupils: Pupils are equal, round, and reactive to light.   Neck:      Thyroid: No thyroid mass or thyromegaly.      Trachea: Trachea and phonation normal. No tracheal tenderness or tracheal deviation.   Pulmonary:      Effort: Pulmonary effort is normal. No respiratory distress.      Breath sounds: No stridor.   Abdominal:      General: There is no distension.   Lymphadenopathy:      Head:      Right side of head: No submental, submandibular, preauricular or posterior auricular adenopathy.      Left side of head: No submental, submandibular, preauricular or posterior auricular adenopathy.      Cervical: No cervical adenopathy.   Skin:     General: Skin is warm and dry.      Findings: No erythema or rash.   Neurological:      Mental Status: She is alert and oriented to person, place, and time.      Cranial Nerves: No cranial nerve deficit.   Psychiatric:         Behavior: Behavior normal.         Assessment:       1. Chronic sore throat    2. Gastroesophageal reflux disease, esophagitis presence not specified        Plan:         Chronic sore throat:  Has been an intermittent issue for over four years.  Reassured patient that her exam today is normal and her symptoms seem worse now that she's been out of her reflux meds over past 2 weeks.   I refilled her Prilosec today and I would recommend getting  back in with GI for further evaluation.  Nurse was able to schedule an appointment before she left today.     Gastroesophageal reflux disease, esophagitis presence not specified:  As above.  If GI workup unremarkable, recommend she RTC with Dr. Shanks for repeat scope examination.

## 2020-08-14 ENCOUNTER — TELEPHONE (OUTPATIENT)
Dept: GASTROENTEROLOGY | Facility: CLINIC | Age: 60
End: 2020-08-14

## 2020-08-14 NOTE — TELEPHONE ENCOUNTER
----- Message from Shannon Villa sent at 8/14/2020 11:23 AM CDT -----  Contact: self/[phone]  Would like to consult with nurse regarding cancel appt and ulcers. Please call back at [phone]. Thanks/ar

## 2020-08-14 NOTE — TELEPHONE ENCOUNTER
Spoke with patient and she stated that she has COVID and will make an appointment when she feels better.

## 2020-09-02 ENCOUNTER — OFFICE VISIT (OUTPATIENT)
Dept: GASTROENTEROLOGY | Facility: CLINIC | Age: 60
End: 2020-09-02
Payer: COMMERCIAL

## 2020-09-02 VITALS
HEIGHT: 64 IN | WEIGHT: 221.81 LBS | OXYGEN SATURATION: 99 % | DIASTOLIC BLOOD PRESSURE: 90 MMHG | HEART RATE: 67 BPM | SYSTOLIC BLOOD PRESSURE: 132 MMHG | BODY MASS INDEX: 37.87 KG/M2

## 2020-09-02 DIAGNOSIS — J02.9 SORE THROAT: Primary | ICD-10-CM

## 2020-09-02 DIAGNOSIS — K21.9 GASTROESOPHAGEAL REFLUX DISEASE, ESOPHAGITIS PRESENCE NOT SPECIFIED: ICD-10-CM

## 2020-09-02 PROCEDURE — 99999 PR PBB SHADOW E&M-EST. PATIENT-LVL III: CPT | Mod: PBBFAC,,, | Performed by: NURSE PRACTITIONER

## 2020-09-02 PROCEDURE — 99999 PR PBB SHADOW E&M-EST. PATIENT-LVL III: ICD-10-PCS | Mod: PBBFAC,,, | Performed by: NURSE PRACTITIONER

## 2020-09-02 PROCEDURE — 99213 PR OFFICE/OUTPT VISIT, EST, LEVL III, 20-29 MIN: ICD-10-PCS | Mod: S$GLB,,, | Performed by: NURSE PRACTITIONER

## 2020-09-02 PROCEDURE — 99213 OFFICE O/P EST LOW 20 MIN: CPT | Mod: S$GLB,,, | Performed by: NURSE PRACTITIONER

## 2020-09-02 PROCEDURE — 3080F PR MOST RECENT DIASTOLIC BLOOD PRESSURE >= 90 MM HG: ICD-10-PCS | Mod: CPTII,S$GLB,, | Performed by: NURSE PRACTITIONER

## 2020-09-02 PROCEDURE — 3075F PR MOST RECENT SYSTOLIC BLOOD PRESS GE 130-139MM HG: ICD-10-PCS | Mod: CPTII,S$GLB,, | Performed by: NURSE PRACTITIONER

## 2020-09-02 PROCEDURE — 3080F DIAST BP >= 90 MM HG: CPT | Mod: CPTII,S$GLB,, | Performed by: NURSE PRACTITIONER

## 2020-09-02 PROCEDURE — 3008F BODY MASS INDEX DOCD: CPT | Mod: CPTII,S$GLB,, | Performed by: NURSE PRACTITIONER

## 2020-09-02 PROCEDURE — 3075F SYST BP GE 130 - 139MM HG: CPT | Mod: CPTII,S$GLB,, | Performed by: NURSE PRACTITIONER

## 2020-09-02 PROCEDURE — 3008F PR BODY MASS INDEX (BMI) DOCUMENTED: ICD-10-PCS | Mod: CPTII,S$GLB,, | Performed by: NURSE PRACTITIONER

## 2020-09-02 NOTE — PROGRESS NOTES
Clinic Follow Up:  [facility] Follow Up Note    Reason for Follow Up:  The primary encounter diagnosis was Sore throat. A diagnosis of Gastroesophageal reflux disease, esophagitis presence not specified was also pertinent to this visit.    PCP: Clara Villanueva       HPI:  This is a 60 y.o. female here for follow up of the above. She has chronic sore throat. She was placed on omeprazole in April 2020 which resolved her sore throat. She took the medications until her refills were out then stopped. Her sore throat returned after discontinuing PPI. She saw ENT on 8/7/20 that restarted her on PPI.     Review of Systems   Constitutional: Negative for activity change and appetite change.        As per interval history above   HENT: Positive for sore throat.    Respiratory: Negative for cough and shortness of breath.    Cardiovascular: Negative for chest pain.   Gastrointestinal: Negative for abdominal pain, blood in stool, constipation, diarrhea, nausea and vomiting.   Skin: Negative for color change and rash.       Medical History:  Past Medical History:   Diagnosis Date    CAD (coronary artery disease)     Dr mccormick    Diverticulosis     Hyperlipidemia     Hypertension     Thyroid nodule        Surgical History:   Past Surgical History:   Procedure Laterality Date    APPENDECTOMY      CHOLECYSTECTOMY      CORONARY ARTERY BYPASS GRAFT      ESOPHAGOGASTRODUODENOSCOPY N/A 9/14/2018    Procedure: ESOPHAGOGASTRODUODENOSCOPY (EGD);  Surgeon: Keegan Arvizu III, MD;  Location: [facility];  Service: Endoscopy;  Laterality: N/A;    HYSTERECTOMY         Family History:   Family History   Problem Relation Age of Onset    No Known Problems Mother     Heart disease Father     Hypertension Father     Migraines Neg Hx     Seizures Neg Hx     Thyroid disease Neg Hx     Asthma Neg Hx        Social History:   Social History     Tobacco Use    Smoking status: Never Smoker    Smokeless tobacco:  "Never Used   Substance Use Topics    Alcohol use: No    Drug use: No       Allergies:   Review of patient's allergies indicates:   Allergen Reactions    Codeine Itching    Pcn [penicillins] Itching    Sulfa (sulfonamide antibiotics) Itching       Home Medications:  Current Outpatient Medications on File Prior to Visit   Medication Sig Dispense Refill    fluticasone (FLONASE) 50 mcg/actuation nasal spray 1 spray (50 mcg total) by Each Nare route once daily. 16 g 0    omeprazole (PRILOSEC) 40 MG capsule Take 1 capsule (40 mg total) by mouth once daily. 30 capsule 3    aspirin 325 MG tablet Take 325 mg by mouth once daily.      azilsartan medoxomil (EDARBI) 80 mg Tab Take 1 tablet by mouth once daily.      furosemide (LASIX) 40 MG tablet Take 40 mg by mouth once daily.      POTASSIUM CHLORIDE (KLOR-CON ORAL) Take 1 tablet by mouth once daily.      sucralfate (CARAFATE) 1 gram tablet Take 1 tablet (1 g total) by mouth 4 (four) times daily before meals and nightly. (Patient not taking: Reported on 9/2/2020) 120 tablet 0     No current facility-administered medications on file prior to visit.        BP (!) 132/90   Pulse 67   Ht 5' 4" (1.626 m)   Wt 100.6 kg (221 lb 12.5 oz)   SpO2 99%   BMI 38.07 kg/m²   Body mass index is 38.07 kg/m².  Physical Exam   Constitutional: She is oriented to person, place, and time and well-developed, well-nourished, and in no distress. No distress.   HENT:   Head: Normocephalic.   Eyes: Pupils are equal, round, and reactive to light. Conjunctivae are normal.   Cardiovascular: Normal rate, regular rhythm and normal heart sounds.   Pulmonary/Chest: Effort normal and breath sounds normal. No respiratory distress.   Abdominal: Soft. Bowel sounds are normal. She exhibits no distension. There is no abdominal tenderness.   Neurological: She is alert and oriented to person, place, and time. No cranial nerve deficit.   Skin: Skin is warm and dry. No rash noted.   Psychiatric: Mood " and affect normal.       Labs: Pertinent labs reviewed.    Assessment:   1. Sore throat    2. Gastroesophageal reflux disease, esophagitis presence not specified      Resolved on PPI    Recommendations:   Continue indefinitely     Sore throat    Gastroesophageal reflux disease, esophagitis presence not specified        Return to Clinic:  Follow up in about 1 year (around 9/2/2021).    Thank you for the opportunity to participate in the care of this patient.  LINA Ragland

## 2020-10-06 ENCOUNTER — PATIENT MESSAGE (OUTPATIENT)
Dept: ADMINISTRATIVE | Facility: HOSPITAL | Age: 60
End: 2020-10-06

## 2021-01-06 NOTE — PROGRESS NOTES
Spoke with pt re scheduling appt with Dr. Villanueva for HTN. Pt stated will call back to schedule appt.   Medical Necessity Statement: Based on Mohs AUC, Mohs surgery is the most appropriate treatment for this cancer compared to other treatments.

## 2021-01-15 NOTE — PROGRESS NOTES
BMI above normal limits, recommend weight loss, improve diet and follow up with internist. Subjective:       Patient ID: Kellie Joreg is a 57 y.o. female.    Chief Complaint: Sore Throat    Patient is a very pleasant 57 year old female here to see me today for the first time in consultation at the request of Dr. Villanueva for evaluation of intermittent sore throat since December.  She says when it first started, she had a severe burning pain in her throat and odynophagia.  She was seen at [facility] where strep screen was positive.  She was started on oral Clindamycin but did not see improvement after week and it was causing diarrhea.  She went back twice and had two Rocephin injections and Decadron injection.  She notes improvement since then but still has episodic sore throat, exacerbated with swallowing.  Says it's not daily and may last minutes to few hours.  She denies heartburn; she takes Omeprazole 40 mg daily.  Denies globus sensation; denies choking or dysphagia.  Denies respiratory distress.  She saw PCP 1/15/18 and repeat strep screen was negative.  She's been on Flonase since then.  No fever since December.  Denies significant nasal drainage or postnasal drip.  She has occasional pressure in mid-face but denies facial pain.  Denies nasal obstruction.  She has intermittent left ear pain but denies ear drainage; denies tinnitus; denies changes in hearing or dizziness.  She has thyroid US several years ago and saw ENT in [place] for thyroid nodules but was told they were too small to biopsy.  She's not had repeat thyroid imaging since then.  Denies previous radiation to her head or neck.  Denies family history of thyroid cancer.  Denies any recent changes in her voice or hoarseness.  Denies any recent swelling in her neck.      Review of Systems   Constitutional: Positive for fatigue. Negative for chills, fever and unexpected weight change.   HENT: Positive for ear pain (left), sinus pressure (mild), sneezing (occasional), sore throat and trouble swallowing (pain with swallowing at  times). Negative for congestion, dental problem, ear discharge, facial swelling, hearing loss, nosebleeds, postnasal drip, rhinorrhea, sinus pain, tinnitus and voice change.    Eyes: Negative for redness, itching and visual disturbance.   Respiratory: Negative for cough, choking, shortness of breath and wheezing.    Cardiovascular: Negative for chest pain and palpitations.   Gastrointestinal: Negative for diarrhea and vomiting.        Reflux but denies heartburn.   Musculoskeletal: Negative for gait problem.   Skin: Negative for rash.   Allergic/Immunologic: Negative for food allergies.   Neurological: Negative for dizziness, light-headedness and headaches.   Psychiatric/Behavioral: Negative for confusion.       Objective:      Physical Exam   Constitutional: She is oriented to person, place, and time. She appears well-developed and well-nourished. She is cooperative. No distress.   HENT:   Head: Normocephalic and atraumatic.   Right Ear: Tympanic membrane, external ear and ear canal normal. No tenderness. Tympanic membrane is not erythematous. No middle ear effusion.   Left Ear: Tympanic membrane, external ear and ear canal normal. No tenderness. Tympanic membrane is not erythematous.  No middle ear effusion.   Nose: Mucosal edema present. No rhinorrhea, nasal deformity or septal deviation. No epistaxis. Right sinus exhibits no maxillary sinus tenderness and no frontal sinus tenderness. Left sinus exhibits no maxillary sinus tenderness and no frontal sinus tenderness.   Mouth/Throat: Uvula is midline, oropharynx is clear and moist and mucous membranes are normal. Mucous membranes are not pale and not dry. She has dentures (upper). No trismus in the jaw. Abnormal dentition (missing most of lower molars). No uvula swelling or dental caries. No oropharyngeal exudate, posterior oropharyngeal edema or posterior oropharyngeal erythema. Tonsils are 2+ on the right. Tonsils are 2+ on the left. No tonsillar exudate.   Eyes:  Conjunctivae, EOM and lids are normal. Pupils are equal, round, and reactive to light. Right eye exhibits no chemosis. Left eye exhibits no chemosis. Right conjunctiva is not injected. Left conjunctiva is not injected. No scleral icterus. Right eye exhibits normal extraocular motion and no nystagmus. Left eye exhibits normal extraocular motion and no nystagmus.   Neck: Trachea normal and phonation normal. No tracheal tenderness present. No tracheal deviation present. Thyromegaly (prominent; no discrete nodules palpated) present. No thyroid mass present.   Cardiovascular: Normal pulses.    Pulmonary/Chest: Effort normal. No stridor. No respiratory distress.   Abdominal: She exhibits no distension.   Lymphadenopathy:        Head (right side): No submental, no submandibular, no preauricular and no posterior auricular adenopathy present.        Head (left side): No submental, no submandibular, no preauricular and no posterior auricular adenopathy present.     She has no cervical adenopathy.   Neurological: She is alert and oriented to person, place, and time. No cranial nerve deficit.   Skin: Skin is warm and dry. No rash noted. No erythema.   Psychiatric: She has a normal mood and affect. Her behavior is normal.       Assessment:       1. Sore throat    2. Left ear pain    3. History of thyroid nodule        Plan:         1.  Sore throat:  Intermittent (not daily) for past 4+ weeks.  Some improvement after treatment for strep.  Her throat exam today is normal and not concerning for infection.  She's also not had fever recently.  Her repeat strep screen on 1/15 was negative; no need to repeat at this time.  We discussed other things that can cause sore throat include allergies and postnasal drainage as well as reflux.  Recommend she continue the Flonase daily.  Also discussed symptoms of reflux, as well as lifestyle modifications to decrease reflux. Recommend continued use of her oral Omeprazole 40 mg each day.  Will  have RTC with Dr. Landrum or Dimitris for scope examination for further evaluation. Discussed that she may need additional followup with GI pending those results.  2.  Left otalgia:  Reassured patient that her ear exam today is normal.  I see no signs of otitis externa and no middle ear effusion.  Discussed that otalgia can sometimes be referred from dental issues.  Recommend scope exam as above and possible followup with her dentist if normal.  3.  Hx of thyroid nodule:  We discussed the function of the thyroid gland, as well as relevant anatomy.  Recommend repeat thyroid ultrasound as her last one was over 2 years ago.  We discussed that the current guidelines recommend US guided FNA of any nodule greater than 1 cm.  Will plan for her to RTC after US done to review results and for scope examination at same time.  She voiced understanding.  Instructed her to call sooner with any worsening symptoms.    Thanks for the referral Dr. Villanueva.  Report returned via EPIC.